# Patient Record
Sex: MALE | Race: WHITE | Employment: UNEMPLOYED | URBAN - METROPOLITAN AREA
[De-identification: names, ages, dates, MRNs, and addresses within clinical notes are randomized per-mention and may not be internally consistent; named-entity substitution may affect disease eponyms.]

---

## 2021-05-22 ENCOUNTER — ANESTHESIA EVENT (OUTPATIENT)
Dept: SURGERY | Age: 51
End: 2021-05-22
Payer: COMMERCIAL

## 2021-05-24 ENCOUNTER — HOSPITAL ENCOUNTER (OUTPATIENT)
Age: 51
Setting detail: OUTPATIENT SURGERY
Discharge: HOME | End: 2021-05-24
Attending: SURGERY | Admitting: SURGERY
Payer: COMMERCIAL

## 2021-05-24 ENCOUNTER — ANESTHESIA (OUTPATIENT)
Dept: SURGERY | Age: 51
End: 2021-05-24
Payer: COMMERCIAL

## 2021-05-24 VITALS
RESPIRATION RATE: 16 BRPM | BODY MASS INDEX: 22.96 KG/M2 | HEART RATE: 65 BPM | OXYGEN SATURATION: 95 % | TEMPERATURE: 98 F | WEIGHT: 160 LBS | DIASTOLIC BLOOD PRESSURE: 67 MMHG | SYSTOLIC BLOOD PRESSURE: 112 MMHG

## 2021-05-24 DIAGNOSIS — K40.90 RIGHT INGUINAL HERNIA: ICD-10-CM

## 2021-05-24 DIAGNOSIS — K42.0 INCARCERATED UMBILICAL HERNIA: Primary | ICD-10-CM

## 2021-05-24 LAB — HGB BLD-MCNC: 15.8 G/DL (ref 13.6–17.2)

## 2021-05-24 PROCEDURE — C1781 MESH (IMPLANTABLE): HCPCS | Performed by: SURGERY

## 2021-05-24 PROCEDURE — 49587 PR REPAIR UMBILICAL HERN,5+Y/O,STRANG: CPT | Performed by: SURGERY

## 2021-05-24 PROCEDURE — 77030008606 HC TRCR ENDOSC KII AMR -B: Performed by: SURGERY

## 2021-05-24 PROCEDURE — 74011250636 HC RX REV CODE- 250/636: Performed by: ANESTHESIOLOGY

## 2021-05-24 PROCEDURE — 77030037088 HC TUBE ENDOTRACH ORAL NSL COVD-A: Performed by: ANESTHESIOLOGY

## 2021-05-24 PROCEDURE — 77030020703 HC SEAL CANN DISP INTU -B: Performed by: SURGERY

## 2021-05-24 PROCEDURE — 77030031139 HC SUT VCRL2 J&J -A: Performed by: SURGERY

## 2021-05-24 PROCEDURE — 2709999900 HC NON-CHARGEABLE SUPPLY: Performed by: SURGERY

## 2021-05-24 PROCEDURE — 77030008518 HC TBNG INSUF ENDO STRY -B: Performed by: SURGERY

## 2021-05-24 PROCEDURE — 76210000021 HC REC RM PH II 0.5 TO 1 HR: Performed by: SURGERY

## 2021-05-24 PROCEDURE — 77030040922 HC BLNKT HYPOTHRM STRY -A: Performed by: ANESTHESIOLOGY

## 2021-05-24 PROCEDURE — 77030035277 HC OBTRTR BLDELSS DISP INTU -B: Performed by: SURGERY

## 2021-05-24 PROCEDURE — 74011000250 HC RX REV CODE- 250: Performed by: NURSE ANESTHETIST, CERTIFIED REGISTERED

## 2021-05-24 PROCEDURE — 74011250636 HC RX REV CODE- 250/636: Performed by: SURGERY

## 2021-05-24 PROCEDURE — 85018 HEMOGLOBIN: CPT

## 2021-05-24 PROCEDURE — 77030020829: Performed by: SURGERY

## 2021-05-24 PROCEDURE — 76210000006 HC OR PH I REC 0.5 TO 1 HR: Performed by: SURGERY

## 2021-05-24 PROCEDURE — 74011250636 HC RX REV CODE- 250/636: Performed by: NURSE ANESTHETIST, CERTIFIED REGISTERED

## 2021-05-24 PROCEDURE — 77030040361 HC SLV COMPR DVT MDII -B: Performed by: SURGERY

## 2021-05-24 PROCEDURE — 77030016151 HC PROTCTR LNS DFOG COVD -B: Performed by: SURGERY

## 2021-05-24 PROCEDURE — 74011000250 HC RX REV CODE- 250: Performed by: SURGERY

## 2021-05-24 PROCEDURE — 76060000035 HC ANESTHESIA 2 TO 2.5 HR: Performed by: SURGERY

## 2021-05-24 PROCEDURE — 49650 LAP ING HERNIA REPAIR INIT: CPT | Performed by: SURGERY

## 2021-05-24 PROCEDURE — 74011250637 HC RX REV CODE- 250/637: Performed by: ANESTHESIOLOGY

## 2021-05-24 PROCEDURE — 77030008522 HC TBNG INSUF LAPRO STRY -B: Performed by: SURGERY

## 2021-05-24 PROCEDURE — 77030022704 HC SUT VLOC COVD -B: Performed by: SURGERY

## 2021-05-24 PROCEDURE — 77030002912 HC SUT ETHBND J&J -A: Performed by: SURGERY

## 2021-05-24 PROCEDURE — 77030040830 HC CATH URETH FOL MDII -A: Performed by: SURGERY

## 2021-05-24 PROCEDURE — 77030019908 HC STETH ESOPH SIMS -A: Performed by: ANESTHESIOLOGY

## 2021-05-24 PROCEDURE — 77030039425 HC BLD LARYNG TRULITE DISP TELE -A: Performed by: ANESTHESIOLOGY

## 2021-05-24 PROCEDURE — 76010000876 HC OR TIME 2 TO 2.5HR INTENSV - TIER 2: Performed by: SURGERY

## 2021-05-24 DEVICE — MESH SLF-FLT PROGRIP RT -- PROGRIP: Type: IMPLANTABLE DEVICE | Site: GROIN | Status: FUNCTIONAL

## 2021-05-24 RX ORDER — LIDOCAINE HYDROCHLORIDE 10 MG/ML
0.1 INJECTION INFILTRATION; PERINEURAL AS NEEDED
Status: DISCONTINUED | OUTPATIENT
Start: 2021-05-24 | End: 2021-05-24 | Stop reason: HOSPADM

## 2021-05-24 RX ORDER — PROPOFOL 10 MG/ML
INJECTION, EMULSION INTRAVENOUS AS NEEDED
Status: DISCONTINUED | OUTPATIENT
Start: 2021-05-24 | End: 2021-05-24 | Stop reason: HOSPADM

## 2021-05-24 RX ORDER — CEFAZOLIN SODIUM/WATER 2 G/20 ML
2 SYRINGE (ML) INTRAVENOUS ONCE
Status: COMPLETED | OUTPATIENT
Start: 2021-05-24 | End: 2021-05-24

## 2021-05-24 RX ORDER — DEXAMETHASONE SODIUM PHOSPHATE 4 MG/ML
INJECTION, SOLUTION INTRA-ARTICULAR; INTRALESIONAL; INTRAMUSCULAR; INTRAVENOUS; SOFT TISSUE AS NEEDED
Status: DISCONTINUED | OUTPATIENT
Start: 2021-05-24 | End: 2021-05-24 | Stop reason: HOSPADM

## 2021-05-24 RX ORDER — NEOSTIGMINE METHYLSULFATE 1 MG/ML
INJECTION, SOLUTION INTRAVENOUS AS NEEDED
Status: DISCONTINUED | OUTPATIENT
Start: 2021-05-24 | End: 2021-05-24 | Stop reason: HOSPADM

## 2021-05-24 RX ORDER — OXYCODONE HYDROCHLORIDE 5 MG/1
10 TABLET ORAL
Status: COMPLETED | OUTPATIENT
Start: 2021-05-24 | End: 2021-05-24

## 2021-05-24 RX ORDER — OXYCODONE AND ACETAMINOPHEN 5; 325 MG/1; MG/1
1 TABLET ORAL
Qty: 20 TABLET | Refills: 0 | Status: SHIPPED | OUTPATIENT
Start: 2021-05-24 | End: 2021-05-29

## 2021-05-24 RX ORDER — LIDOCAINE HYDROCHLORIDE 20 MG/ML
INJECTION, SOLUTION EPIDURAL; INFILTRATION; INTRACAUDAL; PERINEURAL AS NEEDED
Status: DISCONTINUED | OUTPATIENT
Start: 2021-05-24 | End: 2021-05-24 | Stop reason: HOSPADM

## 2021-05-24 RX ORDER — DIPHENHYDRAMINE HYDROCHLORIDE 50 MG/ML
12.5 INJECTION, SOLUTION INTRAMUSCULAR; INTRAVENOUS
Status: DISCONTINUED | OUTPATIENT
Start: 2021-05-24 | End: 2021-05-24 | Stop reason: HOSPADM

## 2021-05-24 RX ORDER — ACETAMINOPHEN 500 MG
1000 TABLET ORAL ONCE
Status: COMPLETED | OUTPATIENT
Start: 2021-05-24 | End: 2021-05-24

## 2021-05-24 RX ORDER — FLUMAZENIL 0.1 MG/ML
0.2 INJECTION INTRAVENOUS AS NEEDED
Status: DISCONTINUED | OUTPATIENT
Start: 2021-05-24 | End: 2021-05-24 | Stop reason: HOSPADM

## 2021-05-24 RX ORDER — MIDAZOLAM HYDROCHLORIDE 1 MG/ML
2 INJECTION, SOLUTION INTRAMUSCULAR; INTRAVENOUS
Status: DISCONTINUED | OUTPATIENT
Start: 2021-05-24 | End: 2021-05-24 | Stop reason: HOSPADM

## 2021-05-24 RX ORDER — SODIUM CHLORIDE, SODIUM LACTATE, POTASSIUM CHLORIDE, CALCIUM CHLORIDE 600; 310; 30; 20 MG/100ML; MG/100ML; MG/100ML; MG/100ML
75 INJECTION, SOLUTION INTRAVENOUS CONTINUOUS
Status: DISCONTINUED | OUTPATIENT
Start: 2021-05-24 | End: 2021-05-24 | Stop reason: HOSPADM

## 2021-05-24 RX ORDER — ROCURONIUM BROMIDE 10 MG/ML
INJECTION, SOLUTION INTRAVENOUS AS NEEDED
Status: DISCONTINUED | OUTPATIENT
Start: 2021-05-24 | End: 2021-05-24 | Stop reason: HOSPADM

## 2021-05-24 RX ORDER — ONDANSETRON 2 MG/ML
INJECTION INTRAMUSCULAR; INTRAVENOUS AS NEEDED
Status: DISCONTINUED | OUTPATIENT
Start: 2021-05-24 | End: 2021-05-24 | Stop reason: HOSPADM

## 2021-05-24 RX ORDER — GLYCOPYRROLATE 0.2 MG/ML
INJECTION INTRAMUSCULAR; INTRAVENOUS AS NEEDED
Status: DISCONTINUED | OUTPATIENT
Start: 2021-05-24 | End: 2021-05-24 | Stop reason: HOSPADM

## 2021-05-24 RX ORDER — HYDROMORPHONE HYDROCHLORIDE 1 MG/ML
0.5 INJECTION, SOLUTION INTRAMUSCULAR; INTRAVENOUS; SUBCUTANEOUS
Status: DISCONTINUED | OUTPATIENT
Start: 2021-05-24 | End: 2021-05-24 | Stop reason: HOSPADM

## 2021-05-24 RX ORDER — NALOXONE HYDROCHLORIDE 0.4 MG/ML
0.1 INJECTION, SOLUTION INTRAMUSCULAR; INTRAVENOUS; SUBCUTANEOUS
Status: DISCONTINUED | OUTPATIENT
Start: 2021-05-24 | End: 2021-05-24 | Stop reason: HOSPADM

## 2021-05-24 RX ORDER — BUPIVACAINE HYDROCHLORIDE AND EPINEPHRINE 2.5; 5 MG/ML; UG/ML
INJECTION, SOLUTION EPIDURAL; INFILTRATION; INTRACAUDAL; PERINEURAL AS NEEDED
Status: DISCONTINUED | OUTPATIENT
Start: 2021-05-24 | End: 2021-05-24 | Stop reason: HOSPADM

## 2021-05-24 RX ORDER — OXYCODONE HYDROCHLORIDE 5 MG/1
5 TABLET ORAL
Status: DISCONTINUED | OUTPATIENT
Start: 2021-05-24 | End: 2021-05-24 | Stop reason: HOSPADM

## 2021-05-24 RX ORDER — FENTANYL CITRATE 50 UG/ML
INJECTION, SOLUTION INTRAMUSCULAR; INTRAVENOUS AS NEEDED
Status: DISCONTINUED | OUTPATIENT
Start: 2021-05-24 | End: 2021-05-24 | Stop reason: HOSPADM

## 2021-05-24 RX ADMIN — PROPOFOL 200 MG: 10 INJECTION, EMULSION INTRAVENOUS at 13:18

## 2021-05-24 RX ADMIN — OXYCODONE 10 MG: 5 TABLET ORAL at 15:59

## 2021-05-24 RX ADMIN — Medication 3 MG: at 14:52

## 2021-05-24 RX ADMIN — SODIUM CHLORIDE, SODIUM LACTATE, POTASSIUM CHLORIDE, AND CALCIUM CHLORIDE 75 ML/HR: 600; 310; 30; 20 INJECTION, SOLUTION INTRAVENOUS at 11:04

## 2021-05-24 RX ADMIN — GLYCOPYRROLATE 0.4 MG: 0.2 INJECTION, SOLUTION INTRAMUSCULAR; INTRAVENOUS at 14:52

## 2021-05-24 RX ADMIN — ROCURONIUM BROMIDE 50 MG: 10 INJECTION, SOLUTION INTRAVENOUS at 13:18

## 2021-05-24 RX ADMIN — LIDOCAINE HYDROCHLORIDE 80 MG: 20 INJECTION, SOLUTION EPIDURAL; INFILTRATION; INTRACAUDAL; PERINEURAL at 13:18

## 2021-05-24 RX ADMIN — DEXAMETHASONE SODIUM PHOSPHATE 4 MG: 4 INJECTION, SOLUTION INTRAMUSCULAR; INTRAVENOUS at 13:44

## 2021-05-24 RX ADMIN — CEFAZOLIN 2 G: 1 INJECTION, POWDER, FOR SOLUTION INTRAVENOUS at 13:30

## 2021-05-24 RX ADMIN — ONDANSETRON 4 MG: 2 INJECTION INTRAMUSCULAR; INTRAVENOUS at 13:44

## 2021-05-24 RX ADMIN — SODIUM CHLORIDE, SODIUM LACTATE, POTASSIUM CHLORIDE, AND CALCIUM CHLORIDE: 600; 310; 30; 20 INJECTION, SOLUTION INTRAVENOUS at 14:06

## 2021-05-24 RX ADMIN — ACETAMINOPHEN 1000 MG: 500 TABLET, FILM COATED ORAL at 11:04

## 2021-05-24 RX ADMIN — FENTANYL CITRATE 100 MCG: 50 INJECTION INTRAMUSCULAR; INTRAVENOUS at 13:18

## 2021-05-24 NOTE — ANESTHESIA PREPROCEDURE EVALUATION
Relevant Problems   No relevant active problems       Anesthetic History   No history of anesthetic complications            Review of Systems / Medical History  Patient summary reviewed and pertinent labs reviewed    Pulmonary  Within defined limits                 Neuro/Psych         Psychiatric history    Comments: Parkinson's dz - DBS, Sinemet Cardiovascular  Within defined limits                Exercise tolerance: >4 METS     GI/Hepatic/Renal  Within defined limits              Endo/Other  Within defined limits           Other Findings              Physical Exam    Airway  Mallampati: II  TM Distance: > 6 cm  Neck ROM: normal range of motion   Mouth opening: Normal     Cardiovascular    Rhythm: regular  Rate: normal         Dental  No notable dental hx       Pulmonary  Breath sounds clear to auscultation               Abdominal         Other Findings            Anesthetic Plan    ASA: 2  Anesthesia type: general            Anesthetic plan and risks discussed with: Patient and Spouse

## 2021-05-24 NOTE — ANESTHESIA POSTPROCEDURE EVALUATION
Procedure(s): HERNIA INGUINAL REPAIR ROBOTIC ASSISTED RIGHT WITH MESH  HERNIA UMBILICAL REPAIR. general    Anesthesia Post Evaluation      Multimodal analgesia: multimodal analgesia used between 6 hours prior to anesthesia start to PACU discharge  Patient location during evaluation: PACU  Patient participation: complete - patient participated  Level of consciousness: awake  Pain management: adequate  Airway patency: patent  Anesthetic complications: no  Cardiovascular status: acceptable and hemodynamically stable  Respiratory status: acceptable  Hydration status: acceptable  Comments: Acceptable for discharge from PACU. Post anesthesia nausea and vomiting:  none  Final Post Anesthesia Temperature Assessment:  Normothermia (36.0-37.5 degrees C)      INITIAL Post-op Vital signs:   Vitals Value Taken Time   /61 05/24/21 1559   Temp 36.7 °C (98 °F) 05/24/21 1530   Pulse 68 05/24/21 1600   Resp 16 05/24/21 1530   SpO2 94 % 05/24/21 1600   Vitals shown include unvalidated device data.

## 2021-05-24 NOTE — BRIEF OP NOTE
Brief Postoperative Note    Patient: Latha Bhagat  YOB: 1970  MRN: [id number]    Date of Procedure: 5/24/2021     Pre-Op Diagnosis: Unilateral inguinal hernia without obstruction or gangrene, recurrence not specified [Q49.73]  Umbilical hernia without obstruction and without gangrene [K42.9]    Post-Op Diagnosis: Indirect Right inguinal hernia and incarcerated umbilical hernia      Procedure(s):  OPEN INCARCERATED UMBILICAL HERNIA REPAIR THROUGH SEPARATE INCISION CPT 75317  ROBOTIC RIGHT INGUINAL HERNIA REPAIR WITH MESH CPT 11641    Surgeon(s):  Jose Martinez DO    Surgical Assistant: None    Anesthesia: General     Estimated Blood Loss (mL): Minimal    Complications: None    Specimens: * No specimens in log *     Implants:   Implant Name Type Inv. Item Serial No.  Lot No. LRB No. Used Action   MESH SLF-FLT PROGRIP RT -- PROGRIP - [id number]  MESH SLF-FLT PROGRIP RT -- PROGRIP  COVIDIEN  SURGICAL [id number] Right 1 Implanted       Drains: * No LDAs found *    Findings: Indirect right inguinal hernia, no LIH. Incarcerated UH 0.5 cm. No mesh for UH.     Electronically Signed by Tiana Pineda DO on 5/24/2021 at 2:57 PM  [id number]

## 2021-05-24 NOTE — INTERVAL H&P NOTE
Update History & Physical    The Patient's History and Physical of May 17,   2021 was reviewed with the patient and I examined the patient. There was no changes, however there was an error in the chief complaint of the H and P in the last sentence. He is here to for Right inguinal hernia and umbilical hernia. The surgical site was confirmed by the patient and me. Plan:  The risk, benefits, expected outcome, and alternative to the recommended procedure have been discussed with the patient. Patient understands and wants to proceed with the procedure.     Electronically signed by Radha Ryan DO on 5/24/2021 at 12:39 PM

## 2021-05-24 NOTE — DISCHARGE INSTRUCTIONS
Post op instructions:  1. May shower only, no tub bathing, no hot tub, no swimming. 2. Keep incision clean with regular soap and water. 3. No lifting over 10 lbs. 4. Regular diet as tolerated. 5. May remove topical dressing on Day #2. Leave steri strips until seen in Dr. Martinez's office at follow up appointment. 6. No driving on pain medicines. 7. Resume home medicines as usual.      Georgia Martinez, DO      MEDICATION INTERACTION:    During your procedure you potentially received a medication or medications which may reduce the effectiveness of oral contraceptives. Please consider other forms of contraception for 1 month following your procedure if you are currently using oral contraceptives as your primary form of birth control. In addition to this, we recommend continuing your oral contraceptive as prescribed, unless otherwise instructed by your physician, during this time. ACTIVITY  · As tolerated and as directed by your doctor. · You may shower in 24 hours. Do not take a bath until cleared by MD.     DIET  · Clear liquids until no nausea or vomiting, then light diet for the first day. · Advance to regular diet on second day, unless your doctor orders otherwise. · If nausea and vomiting continues, call your doctor. PAIN  · Take pain medication as directed by your doctor. · Call your doctor if pain is NOT relieved by medication. CALL YOUR DOCTOR IF   · Excessive bleeding that does not stop after holding pressure over the area. · Temperature of 101 degrees F or above. · Excessive redness, swelling or bruising, and/or green or yellow, smelly discharge from incision.     After general anesthesia or intravenous sedation, for 24 hours or while taking prescription Narcotics:  · Limit your activities  · A responsible adult needs to be with you for the next 24 hours  · Do not drive and operate hazardous machinery  · Do not make important personal or business decisions  · Do not drink alcoholic beverages  · If you have not urinated within 8 hours after discharge, and you are experiencing discomfort from urinary retention, please go to the nearest ED. · If you have sleep apnea and have a CPAP machine, please use it for all naps and sleeping. · Please use caution when taking narcotics and any of your home medications that may cause drowsiness. *  Please give a list of your current medications to your Primary Care Provider. *  Please update this list whenever your medications are discontinued, doses are      changed, or new medications (including over-the-counter products) are added. *  Please carry medication information at all times in case of emergency situations. These are general instructions for a healthy lifestyle:  No smoking/ No tobacco products/ Avoid exposure to second hand smoke  Surgeon General's Warning:  Quitting smoking now greatly reduces serious risk to your health. Obesity, smoking, and sedentary lifestyle greatly increases your risk for illness  A healthy diet, regular physical exercise & weight monitoring are important for maintaining a healthy lifestyle    You may be retaining fluid if you have a history of heart failure or if you experience any of the following symptoms:  Weight gain of 3 pounds or more overnight or 5 pounds in a week, increased swelling in our hands or feet or shortness of breath while lying flat in bed. Please call your doctor as soon as you notice any of these symptoms; do not wait until your next office visit.

## 2021-05-25 NOTE — OP NOTES
[address] [facility]  OPERATIVE REPORT    Name:  Paulette Forrest  MR#:  [id number]  :  1970  ACCOUNT #:  [de-identified]  DATE OF SERVICE:  2021    PREOPERATIVE DIAGNOSES:  1. Incarcerated umbilical hernia. 2.  Right inguinal hernia. POSTOPERATIVE DIAGNOSIS:  same. PROCEDURES PERFORMED:  1. Open incarcerated umbilical hernia repair through separate incision, CPT code 51048.  2.  Robotic inguinal hernia repair with mesh through separate incision, CPT code 22549. SURGEON:  Kiel Peña DO    ASSISTANT:  None. ANESTHESIA:  General endotracheal.    COMPLICATIONS:  None. SPECIMENS REMOVED:  None. IMPLANTS:  Right inguinal hernia ProGrip mesh. ESTIMATED BLOOD LOSS:  Minimal.    DISPOSITION:  Stable. COUNTS:  Sponge count, needle count, instrument count all correct x3. DESCRIPTION OF THE PROCEDURE:  This is a 49-year-old male with incarcerated umbilical hernia and right inguinal hernia. He is prepared for open umbilical hernia repair with possible use of mesh and robotic right inguinal hernia repair through separate incisions. Consent was obtained by describing the procedure to the patient including potential complications to include infection, bleeding, and possible use of mesh. Consent was obtained and placed in the final chart. He was administered Ancef 2 g IV preoperatively, taken to the operating suite, placed in the supine position, and general anesthesia was initiated without complications. He was then prepped and draped in the usual sterile fashion. Time-out was taken to confirm the patient name and proper procedure. Following this, a supraumbilical incision was planned. A 0.25% Marcaine with epinephrine was used to anesthetize the skin and subcutaneous tissue. A #11 scalpel blade was used to make a skin incision. Bovie cauterization was used to dissect down to the rectus fascia. Fascia was then incised. 0 Vicryl sutures were placed as anchoring stitches. Peritoneum was elevated between tonsil snips and entered with Metzenbaum scissors. A robotic trocar was then placed into the peritoneum and secured into place and then a CO2 gas was used to create pneumoperitoneum of 15 mmHg. Laparoscope was passed into the abdomen and a brief survey revealed a right inguinal hernia indirect and no evidence of left inguinal hernia. Then, we placed 8.5 mm trocars in the right and left upper quadrant under direct visualization with no evidence of bleeding or injury. The ProGrip mesh was then inserted into the peritoneum safely under direct visualization with no evidence of injury and then we placed a 2-0 V-Loc suture into the peritoneal cavity under direct visualization with no evidence of injury. The robot was then safely docked to the patient. I then took control on the back console and began by placing the needle into the peritoneum for safe keeping and we then began dissecting the right inguinal region by entering the preperitoneal space. The peritoneum was scored above the hernia and then we dissected in the preperitoneal space down to the pubic tubercle medially and just inside the ASIS laterally. We then reduced the hernia dissecting from the cord structures and reflecting the inguinal hernia sac inferiorly. There was a small direct inguinal hernia that was also reduced. Once the preperitoneal space was developed, we placed the ProGrip mesh into the preperitoneal space and unfurled the mesh centering over the hernia defect with a nice even orientation and minimal wrinkling. At this point, we concluded. There was no evidence of bleeding. The peritoneum was then closed with a 2-0 V-Loc in a simple running fashion. The needle was then removed and accounted for on the back table. At this point, we performed a brief survey of the abdomen. There was no evidence of any biliary leakage or enteric contents to suggest a bowel perforation.   All instruments were removed safely from the patient. At this point, the robot was then removed from the patient safely. All trocars were then removed and the supraumbilical port site was then closed with the 0 Vicryl in a figure-of-eight fashion x1 and all of the incisions were then closed with a 3-0 Vicryl in a simple running fashion. We then turned our attention to the umbilical hernia. There was an umbilical hernia that was incarcerated. We planned for an inferior incision and a separate incision was made. A 0.25% Marcaine with epinephrine was used to anesthetize the skin and subcutaneous tissue. A #15 scalpel blade was used to make a curvilinear incision. Bovie cauterization was used to dissect down to the rectus fascia and then the umbilical stalk was divided at the level of the fascia exposing incarcerated omental fat. This was reduced and amputated at the level of the fascia with minimal bleeding and the incarcerated  omental fat was discarded. There was no evidence of strangulation or gangrenous change. The hernia defect was then able to be evaluated and measured 0.5 cm. Mesh was not elected in this case and a #1 Ethibond was used to close the defect in a figure-of-eight fashion x1. At this point, we copiously irrigated with saline until clear. We reattached the umbilical stalk with #1 Ethibond x1 and irrigated once again. We then closed the subcutaneous tissue with 3-0 Vicryl in an interrupted fashion and 3-0 Vicryl in a simple running fashion. Mastisol and Steri-Strips were placed on top of all incisions and sterile dressings were applied. The patient will be extubated and transferred to Recovery stable. FINDINGS:  A 49-year-old male with a right inguinal hernia and incarcerated umbilical hernia. Open umbilical hernia was performed through a separate incision with incarcerated omental fat. No mesh was used. A robotic right inguinal hernia was performed with no evidence of left inguinal hernia.   A ProGrip mesh was placed into the preperitoneal space. He tolerated the procedure well without immediate complications.       [address], DO      [id number]  D:  05/24/2021 15:13  T:  05/24/2021 21:05  JOB #:  [id number]

## 2022-05-24 ENCOUNTER — OFFICE VISIT (OUTPATIENT)
Dept: INTERNAL MEDICINE CLINIC | Facility: CLINIC | Age: 52
End: 2022-05-24

## 2022-05-24 DIAGNOSIS — Z12.5 SCREENING FOR PROSTATE CANCER: ICD-10-CM

## 2022-05-24 DIAGNOSIS — G20 PARKINSON DISEASE (HCC): Primary | ICD-10-CM

## 2022-05-24 LAB
ANION GAP SERPL CALC-SCNC: 3 MMOL/L (ref 7–16)
BASOPHILS # BLD: 0.1 K/UL (ref 0–0.2)
BASOPHILS NFR BLD: 2 % (ref 0–2)
BUN SERPL-MCNC: 10 MG/DL (ref 6–23)
CALCIUM SERPL-MCNC: 8.7 MG/DL (ref 8.3–10.4)
CHLORIDE SERPL-SCNC: 108 MMOL/L (ref 98–107)
CO2 SERPL-SCNC: 28 MMOL/L (ref 21–32)
CREAT SERPL-MCNC: 0.9 MG/DL (ref 0.8–1.5)
DIFFERENTIAL METHOD BLD: ABNORMAL
EOSINOPHIL # BLD: 0.2 K/UL (ref 0–0.8)
EOSINOPHIL NFR BLD: 4 % (ref 0.5–7.8)
ERYTHROCYTE [DISTWIDTH] IN BLOOD BY AUTOMATED COUNT: 12.4 % (ref 11.9–14.6)
GLUCOSE SERPL-MCNC: 88 MG/DL (ref 65–100)
HCT VFR BLD AUTO: 47.4 % (ref 41.1–50.3)
HGB BLD-MCNC: 15.6 G/DL (ref 13.6–17.2)
IMM GRANULOCYTES # BLD AUTO: 0 K/UL (ref 0–0.5)
IMM GRANULOCYTES NFR BLD AUTO: 0 % (ref 0–5)
LYMPHOCYTES # BLD: 1.5 K/UL (ref 0.5–4.6)
LYMPHOCYTES NFR BLD: 34 % (ref 13–44)
MAGNESIUM SERPL-MCNC: 2.3 MG/DL (ref 1.8–2.4)
MCH RBC QN AUTO: 30.2 PG (ref 26.1–32.9)
MCHC RBC AUTO-ENTMCNC: 32.9 G/DL (ref 31.4–35)
MCV RBC AUTO: 91.9 FL (ref 79.6–97.8)
MONOCYTES # BLD: 0.7 K/UL (ref 0.1–1.3)
MONOCYTES NFR BLD: 17 % (ref 4–12)
NEUTS SEG # BLD: 1.8 K/UL (ref 1.7–8.2)
NEUTS SEG NFR BLD: 43 % (ref 43–78)
NRBC # BLD: 0 K/UL (ref 0–0.2)
PLATELET # BLD AUTO: 191 K/UL (ref 150–450)
PMV BLD AUTO: 10.6 FL (ref 9.4–12.3)
POTASSIUM SERPL-SCNC: 4.2 MMOL/L (ref 3.5–5.1)
PSA SERPL-MCNC: 1.6 NG/ML
RBC # BLD AUTO: 5.16 M/UL (ref 4.23–5.6)
SODIUM SERPL-SCNC: 139 MMOL/L (ref 138–145)
WBC # BLD AUTO: 4.2 K/UL (ref 4.3–11.1)

## 2022-05-31 ENCOUNTER — OFFICE VISIT (OUTPATIENT)
Dept: INTERNAL MEDICINE CLINIC | Facility: CLINIC | Age: 52
End: 2022-05-31
Payer: COMMERCIAL

## 2022-05-31 VITALS
DIASTOLIC BLOOD PRESSURE: 68 MMHG | TEMPERATURE: 97.1 F | WEIGHT: 167 LBS | HEART RATE: 71 BPM | SYSTOLIC BLOOD PRESSURE: 94 MMHG | BODY MASS INDEX: 23.91 KG/M2 | OXYGEN SATURATION: 96 % | HEIGHT: 70 IN

## 2022-05-31 DIAGNOSIS — F32.A DEPRESSION, UNSPECIFIED DEPRESSION TYPE: ICD-10-CM

## 2022-05-31 DIAGNOSIS — G20 PARKINSON DISEASE (HCC): Primary | ICD-10-CM

## 2022-05-31 PROCEDURE — 99214 OFFICE O/P EST MOD 30 MIN: CPT | Performed by: INTERNAL MEDICINE

## 2022-05-31 ASSESSMENT — PATIENT HEALTH QUESTIONNAIRE - PHQ9
1. LITTLE INTEREST OR PLEASURE IN DOING THINGS: 0
SUM OF ALL RESPONSES TO PHQ QUESTIONS 1-9: 0
2. FEELING DOWN, DEPRESSED OR HOPELESS: 0
SUM OF ALL RESPONSES TO PHQ9 QUESTIONS 1 & 2: 0

## 2022-05-31 ASSESSMENT — ENCOUNTER SYMPTOMS
CONSTIPATION: 0
NAUSEA: 0
SINUS PAIN: 0
VOMITING: 0
ABDOMINAL PAIN: 0
SHORTNESS OF BREATH: 0
WHEEZING: 0
DIARRHEA: 0

## 2022-05-31 NOTE — PROGRESS NOTES
Denny Jose was seen today for follow-up. Diagnoses and all orders for this visit:    Parkinson disease ([facility] [email or web address] 75.)  -     CBC with Auto Differential; Future  -     Hemoglobin A1C; Future  -     TSH; Future  -     ORTIZ and PE, Serum; Future  -     Lipid Panel; Future    Depression, unspecified depression type  -     sertraline (ZOLOFT) 50 MG tablet; Take 1 tablet by mouth daily          Darcie Betts is a 46 y.o. male    Chief Complaint   Patient presents with    Follow-up     Busy. not exercising . Hemorrhoids still bother him at times  Creams help. Long list things talk about. Enlarged prostate  Joint pain -not concerning. Hearing--wife concerned      No results found for this visit on 05/31/22. Past Medical History:   Diagnosis Date    Depression     Parkinson disease ([facility] [email or web address]] 75.) 2009       Family History   Problem Relation Age of Onset    Obesity Brother     Depression Father     Depression Mother     Other Father         precanerous polyps removed        Social History     Socioeconomic History    Marital status:      Spouse name: Not on file    Number of children: Not on file    Years of education: Not on file    Highest education level: Not on file   Occupational History    Not on file   Tobacco Use    Smoking status: Never Smoker    Smokeless tobacco: Former User   Substance and Sexual Activity    Alcohol use: Yes    Drug use: Not Currently    Sexual activity: Not on file   Other Topics Concern    Not on file   Social History Narrative    Not on file     Social Determinants of Health     Financial Resource Strain:     Difficulty of Paying Living Expenses: Not on file   Food Insecurity:     Worried About Running Out of Food in the Last Year: Not on file    Janna of Food in the Last Year: Not on file   Transportation Needs:     Lack of Transportation (Medical): Not on file    Lack of Transportation (Non-Medical):  Not on file   Physical Activity:     Days of Exercise per Week: Not on file    Minutes of Exercise per Session: Not on file   Stress:     Feeling of Stress : Not on file   Social Connections:     Frequency of Communication with Friends and Family: Not on file    Frequency of Social Gatherings with Friends and Family: Not on file    Attends [employer] Services: Not on file    Active Member of [address] [place] [address] or Organizations: Not on file    Attends Club or Organization Meetings: Not on file    Marital Status: Not on file   Intimate Partner Violence:     Fear of Current or Ex-Partner: Not on file    Emotionally Abused: Not on file    Physically Abused: Not on file    Sexually Abused: Not on file   Housing Stability:     Unable to Pay for Housing in the Last Year: Not on file    Number of [place] in the Last Year: Not on file    Unstable Housing in the Last Year: Not on file         Current Outpatient Medications:     sertraline (ZOLOFT) 50 MG tablet, Take 1 tablet by mouth daily, Disp: 30 tablet, Rfl: 11    carbidopa-levodopa (SINEMET)  MG per tablet, Take 0.5 tablets by mouth 4 times daily, Disp: , Rfl:     rOPINIRole (REQUIP) 0.5 MG tablet, Take 0.5 mg by mouth nightly, Disp: , Rfl:     amantadine (SYMMETREL) 100 MG capsule, Take 100 mg by mouth 3 times daily, Disp: , Rfl:     No Known Allergies      Review of Systems   Constitutional: Negative for appetite change, chills, fatigue and fever. HENT: Negative for sinus pain. Respiratory: Negative for shortness of breath and wheezing. Cardiovascular: Negative for chest pain. Gastrointestinal: Negative for abdominal pain, constipation, diarrhea, nausea and vomiting. Genitourinary: Negative for dysuria and frequency. Musculoskeletal: Positive for gait problem. Negative for myalgias. Neurological: Positive for tremors and weakness. Negative for dizziness. Psychiatric/Behavioral: Negative for dysphoric mood and suicidal ideas. All other systems reviewed and are negative.         Vitals:    05/31/22

## 2022-06-06 ENCOUNTER — OFFICE VISIT (OUTPATIENT)
Dept: NEUROLOGY | Age: 52
End: 2022-06-06
Payer: COMMERCIAL

## 2022-06-06 VITALS
BODY MASS INDEX: 24.48 KG/M2 | HEART RATE: 72 BPM | DIASTOLIC BLOOD PRESSURE: 64 MMHG | HEIGHT: 70 IN | SYSTOLIC BLOOD PRESSURE: 104 MMHG | WEIGHT: 171 LBS

## 2022-06-06 DIAGNOSIS — G20 DEMENTIA DUE TO PARKINSON'S DISEASE WITHOUT BEHAVIORAL DISTURBANCE (HCC): ICD-10-CM

## 2022-06-06 DIAGNOSIS — T42.8X5A MOTOR FLUCTUATIONS RELATED TO MEDICATION USE IN PARKINSON'S DISEASE (HCC): ICD-10-CM

## 2022-06-06 DIAGNOSIS — T42.8X5A LEVODOPA-INDUCED DYSKINESIA: ICD-10-CM

## 2022-06-06 DIAGNOSIS — G24.01 LEVODOPA-INDUCED DYSKINESIA: ICD-10-CM

## 2022-06-06 DIAGNOSIS — F02.80 DEMENTIA DUE TO PARKINSON'S DISEASE WITHOUT BEHAVIORAL DISTURBANCE (HCC): ICD-10-CM

## 2022-06-06 DIAGNOSIS — Z74.09 IMPAIRED FUNCTIONAL MOBILITY, BALANCE, GAIT, AND ENDURANCE: ICD-10-CM

## 2022-06-06 DIAGNOSIS — Z96.89 S/P DEEP BRAIN STIMULATOR PLACEMENT: ICD-10-CM

## 2022-06-06 DIAGNOSIS — G20 MOTOR FLUCTUATIONS RELATED TO MEDICATION USE IN PARKINSON'S DISEASE (HCC): ICD-10-CM

## 2022-06-06 DIAGNOSIS — G20 PARKINSON'S DISEASE (HCC): Primary | ICD-10-CM

## 2022-06-06 PROCEDURE — 95983 ALYS BRN NPGT PRGRMG 15 MIN: CPT | Performed by: PSYCHIATRY & NEUROLOGY

## 2022-06-06 PROCEDURE — 99215 OFFICE O/P EST HI 40 MIN: CPT | Performed by: PSYCHIATRY & NEUROLOGY

## 2022-06-06 RX ORDER — DONEPEZIL HYDROCHLORIDE 5 MG/1
5 TABLET, FILM COATED ORAL NIGHTLY
Qty: 30 TABLET | Refills: 5 | Status: SHIPPED | OUTPATIENT
Start: 2022-06-06

## 2022-06-06 ASSESSMENT — ENCOUNTER SYMPTOMS
COUGH: 0
VOICE CHANGE: 1
CONSTIPATION: 1

## 2022-06-06 NOTE — PROGRESS NOTES
Nancy Carreno  [address], [address] [facility], [address] [place] [address]  Phone: [phone] Fax [phone]  Barbara Gonzalez MD      Patient: Kaylen Boyer  Provider: Barbara Gonzalez MD    CC:   Chief Complaint   Patient presents with    Follow-up     Parkinsons disease     Referring Provider:    History of Present Illness:     Kaylen Boyer is a 46 y.o. RH male who presents for follow up of Parkinson's disease. He is unaccompanied for today's visit. He was last seen November 2021.     He presents for follow-up and continued management of Parkinson's disease, diagnosed in approximately 2009 with tremor in his left upper extremity. He has been followed at [address], undergoing a bilateral STN DBS ([employer]) in May 2017 for his bilateral tremor. He is an INTREPID study participant. Has continued follow up at [address]. He established care with our group in December 2019.      Current medications include:  Carbidopa levodopa  mg 1/2 tablet 4 times a day  Sertraline 50 mg daily  Amantadine 100 mg three times a day  Requip 0.5 mg HS PRN      Previous medication trials include: N/A    He presents today for follow-up. He recently finished his study visits at [address]. He continues to follow-up with our clinic as well. Only minor adjustments to his DBS have been made since his last visit. Usually this is to address worsening motor symptoms on either which include tremor and rigidity. There have been some limitations due to stimulation related adverse effects of dyskinesias with DBS stimulation. There has been some changes in his gait with other moments of hesitation particularly in doorways and with turns. He does note some generalized slowing of his gait overall particularly on the right. Continues to take levodopa as noted above. No adverse effects. No significant wearing off in between doses and no dyskinesias.     He does have some worsening cognitive complaints.   Appears to have more difficulty with executive function and slower processing speed. He feels it takes him longer to do things and this is a source of frustration for him. He notes frequent word finding difficulty. No visual hallucinations and appears to be sleeping well. He continues to take sertraline for his mood and this has been relatively stable.       Review of Systems:   Review of Systems   Constitutional: Negative for fever. HENT: Positive for voice change. Negative for congestion. Eyes: Negative for visual disturbance. Respiratory: Negative for cough. Cardiovascular: Negative for chest pain. Gastrointestinal: Positive for constipation. Genitourinary: Negative for dysuria. Musculoskeletal: Positive for gait problem. Skin: Negative for rash. Neurological: Positive for tremors and weakness. Psychiatric/Behavioral: Positive for confusion (memory loss) and decreased concentration. Negative for hallucinations. Lab/Imaging Review:   I REVIEWED PERTINENT LABS, IMAGES, AND REPORTS WITH THE PATIENT PERSONALLY, DIRECTLY AND FULLY. THE MOST PERTINENT FINDINGS ARE NOTED BELOW:    MRI Brain April 2017:  FINDINGS:   Diffusion imaging shows no hyperacute, acute, or early subacute infarction. Normal supratentorial signal. No definite volume loss of the brainstem or cerebellum. There is no mass or mass effect, or abnormal extra-axial collection. The ventricles are normal in size, shape and position. The paranasal sinuses and the mastoid air cells are predominantly clear. IMPRESSION:   Normal supratentorial and infratentorial signal. No definite volume loss of the brainstem or cerebellum. MR study performed as a pre-DBS protocol. CT Head June 2017:   FINDINGS:    Newly placed deep brain stimulator electrodes terminate in the region of the subthalamic nuclei. No evidence of intracranial hemorrhage or extra-axial collection. The ventricles are normal in size.  The density of the dural venous sinuses is normal. The skull base and calvarium are normal. The included paranasal sinuses and mastoid air cells are predominantly clear. The visualized orbits are unremarkable. Past Medical History:     Past medical history, surgical history, social history, family history, medications, and allergies were reviewed and updated as appropriate. PAST MEDICAL HISTORY:  Past Medical History:   Diagnosis Date    Depression     Parkinson disease ([facility] [address] 2009     PAST SURGICAL HISTORY:   Past Surgical History:   Procedure Laterality Date    HERNIA REPAIR  05/24/2021    Robotic RIHR/R    OTHER SURGICAL HISTORY  2017    DBS-implant    OTHER SURGICAL HISTORY  1988, 2016    neck, fused vertrbrae     FAMILY HISTORY:  Family History   Problem Relation Age of Onset    Obesity Brother     Depression Father     Depression Mother     Other Father         precanerous polyps removed      SOCIAL HISTORY:  Social History     Socioeconomic History    Marital status:      Spouse name: None    Number of children: None    Years of education: None    Highest education level: None   Occupational History    None   Tobacco Use    Smoking status: Never Smoker    Smokeless tobacco: Former User   Substance and Sexual Activity    Alcohol use: Yes    Drug use: Not Currently    Sexual activity: None   Other Topics Concern    None   Social History Narrative    None     Social Determinants of Health     Financial Resource Strain:     Difficulty of Paying Living Expenses: Not on file   Food Insecurity:     Worried About Running Out of Food in the Last Year: Not on file    Janna of Food in the Last Year: Not on file   Transportation Needs:     Lack of Transportation (Medical): Not on file    Lack of Transportation (Non-Medical):  Not on file   Physical Activity:     Days of Exercise per Week: Not on file    Minutes of Exercise per Session: Not on file   Stress:     Feeling of Stress : Not on file   Social Connections:  Frequency of Communication with Friends and Family: Not on file    Frequency of Social Gatherings with Friends and Family: Not on file    Attends Holiness Services: Not on file    Active Member of Clubs or Organizations: Not on file    Attends Club or Organization Meetings: Not on file    Marital Status: Not on file   Intimate Partner Violence:     Fear of Current or Ex-Partner: Not on file    Emotionally Abused: Not on file    Physically Abused: Not on file    Sexually Abused: Not on file   Housing Stability:     Unable to Pay for Housing in the Last Year: Not on file    Number of [place] in the Last Year: Not on file    Unstable Housing in the Last Year: Not on file       Medications/Allergies:     MEDICATIONS:   Outpatient Encounter Medications as of 6/6/2022   Medication Sig Dispense Refill    donepezil (ARICEPT) 5 MG tablet Take 1 tablet by mouth nightly 30 tablet 5    sertraline (ZOLOFT) 50 MG tablet Take 1 tablet by mouth daily 30 tablet 11    amantadine (SYMMETREL) 100 MG capsule Take 100 mg by mouth 3 times daily      carbidopa-levodopa (SINEMET)  MG per tablet Take 0.5 tablets by mouth 4 times daily      rOPINIRole (REQUIP) 0.5 MG tablet Take 0.5 mg by mouth nightly       No facility-administered encounter medications on file as of 6/6/2022. ALLERGIES:  No Known Allergies      Physical Exam:     /64   Pulse 72   Ht 5' 10\" (1.778 m)   Wt 171 lb (77.6 kg)   BMI 24.54 kg/m²     General Exam:  General - Well developed, well nourished, in no apparent distress. HEENT - Normocephalic, atraumatic. Sclera anicteric. Oropharynx clear. Neck - Supple without masses  Cardiovascular - Regular rate and rhythm. No carotid bruits. Lungs - Non-labored breathing.   Abdomen - Soft, nontender, nondistended. Extremities - Peripheral pulses intact. No edema and no rashes.      Neurological Exam:      MS/Language/Speech - Alert. Oriented to person, place, and time.   Attention intact. Language fluent. Speech with mild hypophonia.      Cranial Nerves - PERRL. Eye movements full with saccadic pursuits. No nystagmus. There is diminished facial expression but activation was symmetric and sensation normal. There is slight rightward neck shift. Tongue and palate were midline. Shoulder shrug symmetric.     Motor - Intermittent resting tremor of L>R upper and lower extremity. Rapid alternating movements show mild to moderate slowing of the right upper extremity and moderate slowing in the left upper extremity. Foot tapping is slowed bilaterally. There is a mild to moderate increase in tone bilaterally.     Sensory - Normal to light touch throughout.     Cerebellar - No ataxia or dysmetria.      Reflexes (R/L): Biceps (2+/2+), Triceps (2+/2+), Brachioradialis (2+/2+), Patellar (2+/2+), Ankle (0+/0+)     Gait - Able to rise from a seated position without use of his arms. Posture is mildly stooped. Romberg was normal. He walks with a narrow base with slightly reduced stride length. There is diminished arm swing noted bilaterally. No hesitation freezing was noted today. Postural reflex some delay. DBS Programming:     His [employer] DBS was interrogated. Impedances were within normal limits.     Settings:   L STN              Level 3 (100%)                                   2.3 mA / 40 / 176     R STN             Level 11 (50%) 12 (50%)                    2.2 mA / 70 / 176     Comments:  DBS was interrogated. No changes were made today. Total DBS programming time: 10 minutes      Assessment and Plan:     Checo Sherwood is a 46 y.o. male who presents with the following issues:     Geetha Marlen was seen today for follow-up. Diagnoses and all orders for this visit:    Parkinson's disease ([facility] [address]    Dementia due to Parkinson's disease without behavioral disturbance ([facility] [address]  -     donepezil (ARICEPT) 5 MG tablet;  Take 1 tablet by mouth nightly    Impaired functional mobility, balance, gait, and endurance    Motor fluctuations related to medication use in Parkinson's disease ([address]    Levodopa-induced dyskinesia    S/P deep brain stimulator placement      Patient presents for follow-up of Parkinson's disease s/p bilateral STN DBS complicated by resting tremor, motor fluctuations related to use of levodopa, levodopa-induced dyskinesias, gait disturbances, and mild cognitive impairment. He recently finished the clinical trial ([employer]) study at [address]. DBS was successfully interrogated today but we deferred any changes. Start donepezil 5 mg at night for cognitive dysfunction likely consistent with mild PD dementia. Continue to monitor for any worsening cognitive symptoms or any psychotic symptoms. Discussed increasing Sinemet  mg to 1 tablet 4 times a day in order to address motor fluctuations and worsening mobility.     Discussed referrals to physical therapy for his gait/mobility as well as speech therapy for his hypophonia. He has deferred for now but will let us know if he reconsiders.     He will continue amantadine as prescribed which is also helpful for dyskinesias. He may continue Requip at night as needed for restless leg symptoms.      We will continue sertraline 50 mg daily for his mood.        We will see him back in approximately 6 months. Signature: Gregoria Martinez MD      Date:  6/12/2022    [facility]   [address]  Ph: [phone]  Fax: [phone]         I have personally interviewed and examined Mr. Honey Tapia and I have personally reviewed all relevant records including labs and imaging as noted above. I have written all aspects of this note. More than 50% of this time was used for counseling regarding my diagnosis, prognosis, and plans for management. DBS Programming Time: 10 minutes  Total visit time: 53 minutes.

## 2022-07-11 ENCOUNTER — PATIENT MESSAGE (OUTPATIENT)
Dept: NEUROLOGY | Age: 52
End: 2022-07-11

## 2022-07-11 NOTE — TELEPHONE ENCOUNTER
Requested Prescriptions     Signed Prescriptions Disp Refills    carbidopa-levodopa (SINEMET)  MG per tablet 120 tablet 5     Sig: Take 1 tablet by mouth 4 times daily     Authorizing Provider: Irwin Gaviria     Ordering User: Kanu Guzman

## 2022-09-30 DIAGNOSIS — F32.A DEPRESSION, UNSPECIFIED DEPRESSION TYPE: ICD-10-CM

## 2022-11-30 ENCOUNTER — NURSE ONLY (OUTPATIENT)
Dept: INTERNAL MEDICINE CLINIC | Facility: CLINIC | Age: 52
End: 2022-11-30

## 2022-11-30 DIAGNOSIS — G20 PARKINSON DISEASE (HCC): ICD-10-CM

## 2022-11-30 LAB
BASOPHILS # BLD: 0.1 K/UL (ref 0–0.2)
BASOPHILS NFR BLD: 2 % (ref 0–2)
CHOLEST SERPL-MCNC: 169 MG/DL
DIFFERENTIAL METHOD BLD: NORMAL
EOSINOPHIL # BLD: 0.2 K/UL (ref 0–0.8)
EOSINOPHIL NFR BLD: 3 % (ref 0.5–7.8)
ERYTHROCYTE [DISTWIDTH] IN BLOOD BY AUTOMATED COUNT: 12.2 % (ref 11.9–14.6)
EST. AVERAGE GLUCOSE BLD GHB EST-MCNC: 103 MG/DL
HBA1C MFR BLD: 5.2 % (ref 4.8–5.6)
HCT VFR BLD AUTO: 49.2 % (ref 41.1–50.3)
HDLC SERPL-MCNC: 40 MG/DL (ref 40–60)
HDLC SERPL: 4.2 {RATIO}
HGB BLD-MCNC: 16.1 G/DL (ref 13.6–17.2)
IMM GRANULOCYTES # BLD AUTO: 0 K/UL (ref 0–0.5)
IMM GRANULOCYTES NFR BLD AUTO: 0 % (ref 0–5)
LDLC SERPL CALC-MCNC: 118.2 MG/DL
LYMPHOCYTES # BLD: 2.1 K/UL (ref 0.5–4.6)
LYMPHOCYTES NFR BLD: 38 % (ref 13–44)
MCH RBC QN AUTO: 30.5 PG (ref 26.1–32.9)
MCHC RBC AUTO-ENTMCNC: 32.7 G/DL (ref 31.4–35)
MCV RBC AUTO: 93.2 FL (ref 82–102)
MONOCYTES # BLD: 0.5 K/UL (ref 0.1–1.3)
MONOCYTES NFR BLD: 10 % (ref 4–12)
NEUTS SEG # BLD: 2.6 K/UL (ref 1.7–8.2)
NEUTS SEG NFR BLD: 47 % (ref 43–78)
NRBC # BLD: 0 K/UL (ref 0–0.2)
PLATELET # BLD AUTO: 220 K/UL (ref 150–450)
PMV BLD AUTO: 10 FL (ref 9.4–12.3)
RBC # BLD AUTO: 5.28 M/UL (ref 4.23–5.6)
TRIGL SERPL-MCNC: 54 MG/DL (ref 35–150)
TSH, 3RD GENERATION: 1.45 UIU/ML (ref 0.36–3.74)
VLDLC SERPL CALC-MCNC: 10.8 MG/DL (ref 6–23)
WBC # BLD AUTO: 5.6 K/UL (ref 4.3–11.1)

## 2022-12-07 ENCOUNTER — OFFICE VISIT (OUTPATIENT)
Dept: INTERNAL MEDICINE CLINIC | Facility: CLINIC | Age: 52
End: 2022-12-07
Payer: COMMERCIAL

## 2022-12-07 VITALS
HEART RATE: 82 BPM | BODY MASS INDEX: 23.77 KG/M2 | OXYGEN SATURATION: 96 % | SYSTOLIC BLOOD PRESSURE: 102 MMHG | HEIGHT: 70 IN | DIASTOLIC BLOOD PRESSURE: 66 MMHG | WEIGHT: 166 LBS

## 2022-12-07 DIAGNOSIS — F33.0 MAJOR DEPRESSIVE DISORDER, RECURRENT, MILD (HCC): ICD-10-CM

## 2022-12-07 DIAGNOSIS — Z12.5 SCREENING FOR PROSTATE CANCER: ICD-10-CM

## 2022-12-07 DIAGNOSIS — G20 PARKINSON DISEASE (HCC): Primary | ICD-10-CM

## 2022-12-07 DIAGNOSIS — Z00.00 ROUTINE ADULT HEALTH MAINTENANCE: ICD-10-CM

## 2022-12-07 PROCEDURE — 99396 PREV VISIT EST AGE 40-64: CPT | Performed by: INTERNAL MEDICINE

## 2022-12-07 ASSESSMENT — PATIENT HEALTH QUESTIONNAIRE - PHQ9
1. LITTLE INTEREST OR PLEASURE IN DOING THINGS: 0
2. FEELING DOWN, DEPRESSED OR HOPELESS: 0
SUM OF ALL RESPONSES TO PHQ QUESTIONS 1-9: 4
10. IF YOU CHECKED OFF ANY PROBLEMS, HOW DIFFICULT HAVE THESE PROBLEMS MADE IT FOR YOU TO DO YOUR WORK, TAKE CARE OF THINGS AT HOME, OR GET ALONG WITH OTHER PEOPLE: 1
3. TROUBLE FALLING OR STAYING ASLEEP: 0
SUM OF ALL RESPONSES TO PHQ QUESTIONS 1-9: 4
SUM OF ALL RESPONSES TO PHQ QUESTIONS 1-9: 4
9. THOUGHTS THAT YOU WOULD BE BETTER OFF DEAD, OR OF HURTING YOURSELF: 0
SUM OF ALL RESPONSES TO PHQ QUESTIONS 1-9: 4
7. TROUBLE CONCENTRATING ON THINGS, SUCH AS READING THE NEWSPAPER OR WATCHING TELEVISION: 1
SUM OF ALL RESPONSES TO PHQ9 QUESTIONS 1 & 2: 0
6. FEELING BAD ABOUT YOURSELF - OR THAT YOU ARE A FAILURE OR HAVE LET YOURSELF OR YOUR FAMILY DOWN: 0
4. FEELING TIRED OR HAVING LITTLE ENERGY: 1
5. POOR APPETITE OR OVEREATING: 0
8. MOVING OR SPEAKING SO SLOWLY THAT OTHER PEOPLE COULD HAVE NOTICED. OR THE OPPOSITE, BEING SO FIGETY OR RESTLESS THAT YOU HAVE BEEN MOVING AROUND A LOT MORE THAN USUAL: 2

## 2022-12-07 ASSESSMENT — ENCOUNTER SYMPTOMS
DIARRHEA: 0
VOMITING: 0
ABDOMINAL PAIN: 0
SINUS PAIN: 0
SHORTNESS OF BREATH: 0
CONSTIPATION: 0
WHEEZING: 0
NAUSEA: 0

## 2022-12-07 NOTE — PROGRESS NOTES
Guru Chambers was seen today for follow-up. Diagnoses and all orders for this visit:    Parkinson disease ([facility] [address]  -     Hepatitis C Antibody; Future  -     HIV 1/2 Ag/Ab, 4TH Generation,W Rflx Confirm; Future  -     CBC with Auto Differential; Future  -     Hemoglobin A1C; Future  -     TSH; Future  -     Lipid Panel; Future  -     PSA Screening; Future    Major depressive disorder, recurrent, mild (HCC)  -     Hepatitis C Antibody; Future  -     HIV 1/2 Ag/Ab, 4TH Generation,W Rflx Confirm; Future  -     CBC with Auto Differential; Future  -     Hemoglobin A1C; Future  -     TSH; Future  -     Lipid Panel; Future  -     PSA Screening; Future    Routine adult health maintenance  -     Hepatitis C Antibody; Future  -     HIV 1/2 Ag/Ab, 4TH Generation,W Rflx Confirm; Future  -     CBC with Auto Differential; Future  -     Hemoglobin A1C; Future  -     TSH; Future  -     Lipid Panel; Future  -     PSA Screening; Future    Screening for prostate cancer  -     Hepatitis C Antibody; Future  -     HIV 1/2 Ag/Ab, 4TH Generation,W Rflx Confirm; Future  -     CBC with Auto Differential; Future  -     Hemoglobin A1C; Future  -     TSH; Future  -     Lipid Panel; Future  -     PSA Screening;  Future        Dexter Bailon is a 46 y.o. male    Chief Complaint   Patient presents with    Follow-up     Check up and review labs elevated LDL   Labs pretty good  C/o some worsening gait,parkinsin,fatigue etc.    Seeing neurology, execrise  level ok,some hiking,      Nurse Only on 11/30/2022   Component Date Value Ref Range Status    Cholesterol, Total 11/30/2022 169  <200 MG/DL Final    Comment: Borderline High: 200-239 mg/dL  High: Greater than or equal to 240 mg/dL      Triglycerides 11/30/2022 54  35 - 150 MG/DL Final    Comment: Borderline High: 150-199 mg/dL, High: 200-499 mg/dL  Very High: Greater than or equal to 500 mg/dL      HDL 11/30/2022 40  40 - 60 MG/DL Final    LDL Calculated 11/30/2022 118.2 (A)  <100 MG/DL Final Comment: Near Optimal: 100-129 mg/dL  Borderline High: 130-159, High: 160-189 mg/dL  Very High: Greater than or equal to 190 mg/dL      VLDL Cholesterol Calculated 11/30/2022 10.8  6.0 - 23.0 MG/DL Final    Chol/HDL Ratio 11/30/2022 4.2    Final    IgG, Serum 11/30/2022 1,231  603 - 1,613 mg/dL Final    IgA 11/30/2022 227  90 - 386 mg/dL Final    IgM 11/30/2022 43  20 - 172 mg/dL Final    Total Protein 11/30/2022 6.6  6.0 - 8.5 g/dL Final    Albumin 11/30/2022 3.9  2.9 - 4.4 g/dL Final    Alpha 1 Globulin 11/30/2022 0.2  0.0 - 0.4 g/dL Final    Alpha 2 Globulin 11/30/2022 0.5  0.4 - 1.0 g/dL Final    BETA GLOBULIN 11/30/2022 0.8  0.7 - 1.3 g/dL Final    GAMMA GLOBULIN 11/30/2022 1.1  0.4 - 1.8 g/dL Final    M-Deonte 11/30/2022 Not Observed  Not Observed g/dL Final    Globulin 11/30/2022 2.7  2.2 - 3.9 g/dL Final    A/G Ratio 11/30/2022 1.5  0.7 - 1.7   Final    IMMUNOFIXATION RESULT 11/30/2022 Comment    Final    Comment: (NOTE)  The immunofixation pattern appears unremarkable. Evidence of  monoclonal protein is not apparent.       TSH, 3RD GENERATION 11/30/2022 1.450  0.358 - 3.740 uIU/mL Final    Hemoglobin A1C 11/30/2022 5.2  4.8 - 5.6 % Final    eAG 11/30/2022 103  mg/dL Final    Comment: Reference Range  Normal: 4.8-5.6  Diabetic >=6.5%  Normal       <5.7%      WBC 11/30/2022 5.6  4.3 - 11.1 K/uL Final    RBC 11/30/2022 5.28  4.23 - 5.6 M/uL Final    Hemoglobin 11/30/2022 16.1  13.6 - 17.2 g/dL Final    Hematocrit 11/30/2022 49.2  41.1 - 50.3 % Final    MCV 11/30/2022 93.2  82 - 102 FL Final    MCH 11/30/2022 30.5  26.1 - 32.9 PG Final    MCHC 11/30/2022 32.7  31.4 - 35.0 g/dL Final    RDW 11/30/2022 12.2  11.9 - 14.6 % Final    Platelets 24/68/4231 220  150 - 450 K/uL Final    MPV 11/30/2022 10.0  9.4 - 12.3 FL Final    nRBC 11/30/2022 0.00  0.0 - 0.2 K/uL Final    **Note: Absolute NRBC parameter is now reported with Hemogram**    Differential Type 11/30/2022 AUTOMATED    Final    Seg Neutrophils 11/30/2022 47 43 - 78 % Final    Lymphocytes 11/30/2022 38  13 - 44 % Final    Monocytes 11/30/2022 10  4.0 - 12.0 % Final    Eosinophils % 11/30/2022 3  0.5 - 7.8 % Final    Basophils 11/30/2022 2  0.0 - 2.0 % Final    Immature Granulocytes 11/30/2022 0  0.0 - 5.0 % Final    Segs Absolute 11/30/2022 2.6  1.7 - 8.2 K/UL Final    Absolute Lymph # 11/30/2022 2.1  0.5 - 4.6 K/UL Final    Absolute Mono # 11/30/2022 0.5  0.1 - 1.3 K/UL Final    Absolute Eos # 11/30/2022 0.2  0.0 - 0.8 K/UL Final    Basophils Absolute 11/30/2022 0.1  0.0 - 0.2 K/UL Final    Absolute Immature Granulocyte 11/30/2022 0.0  0.0 - 0.5 K/UL Final        Past Medical History:   Diagnosis Date    Depression     Parkinson disease ([facility] [address] 2009       Family History   Problem Relation Age of Onset    Obesity Brother     Depression Father     Depression Mother     Other Father         precanerous polyps removed        Social History     Socioeconomic History    Marital status:      Spouse name: Not on file    Number of children: Not on file    Years of education: Not on file    Highest education level: Not on file   Occupational History    Not on file   Tobacco Use    Smoking status: Never    Smokeless tobacco: Former     Quit date: 1/1/1988   Substance and Sexual Activity    Alcohol use: Yes    Drug use: Not Currently    Sexual activity: Not on file   Other Topics Concern    Not on file   Social History Narrative    Not on file     Social Determinants of Health     Financial Resource Strain: Not on file   Food Insecurity: Not on file   Transportation Needs: Not on file   Physical Activity: Not on file   Stress: Not on file   Social Connections: Not on file   Intimate Partner Violence: Not on file   Housing Stability: Not on file         Current Outpatient Medications:     sertraline (ZOLOFT) 50 MG tablet, Take 1 tablet by mouth daily, Disp: 30 tablet, Rfl: 11    carbidopa-levodopa (SINEMET)  MG per tablet, Take 1 tablet by mouth 4 times daily, Disp: 120 tablet, Rfl: 5    donepezil (ARICEPT) 5 MG tablet, Take 1 tablet by mouth nightly, Disp: 30 tablet, Rfl: 5    amantadine (SYMMETREL) 100 MG capsule, Take 100 mg by mouth 3 times daily, Disp: , Rfl:     rOPINIRole (REQUIP) 0.5 MG tablet, Take 0.5 mg by mouth nightly, Disp: , Rfl:     No Known Allergies      Review of Systems   Constitutional:  Positive for fatigue. Negative for appetite change, chills and fever. HENT:  Negative for sinus pain. Respiratory:  Negative for shortness of breath and wheezing. Cardiovascular:  Negative for chest pain. Gastrointestinal:  Negative for abdominal pain, constipation, diarrhea, nausea and vomiting. Genitourinary:  Negative for dysuria and frequency. Musculoskeletal:  Negative for myalgias. Neurological:  Positive for tremors and weakness. Negative for dizziness. Hematological:  Bruises/bleeds easily. Psychiatric/Behavioral:  Positive for hallucinations. Negative for suicidal ideas. All other systems reviewed and are negative. Vitals:    12/07/22 0906   BP: 102/66   Pulse: 82   SpO2: 96%   Weight: 166 lb (75.3 kg)   Height: 5' 10\" (1.778 m)           Physical Exam  Constitutional:       Appearance: He is not ill-appearing. Cardiovascular:      Rate and Rhythm: Normal rate. Pulmonary:      Effort: Pulmonary effort is normal.      Breath sounds: Normal breath sounds. Abdominal:      General: There is no distension. Skin:     Coloration: Skin is not jaundiced. Neurological:      General: No focal deficit present. Mental Status: He is alert. Mental status is at baseline. Motor: Tremor and abnormal muscle tone present. Psychiatric:         Mood and Affect: Mood normal.         Thought Content: Thought content normal.      Counsele increase increase exercise,low fat  Routine maintenance    Nita Waters was seen today for follow-up. Diagnoses and all orders for this visit:    Parkinson disease ([facility] [address]  -     Hepatitis C Antibody;  Future  - HIV 1/2 Ag/Ab, 4TH Generation,W Rflx Confirm; Future  -     CBC with Auto Differential; Future  -     Hemoglobin A1C; Future  -     TSH; Future  -     Lipid Panel; Future  -     PSA Screening; Future    Major depressive disorder, recurrent, mild (HCC)  -     Hepatitis C Antibody; Future  -     HIV 1/2 Ag/Ab, 4TH Generation,W Rflx Confirm; Future  -     CBC with Auto Differential; Future  -     Hemoglobin A1C; Future  -     TSH; Future  -     Lipid Panel; Future  -     PSA Screening; Future    Routine adult health maintenance  -     Hepatitis C Antibody; Future  -     HIV 1/2 Ag/Ab, 4TH Generation,W Rflx Confirm; Future  -     CBC with Auto Differential; Future  -     Hemoglobin A1C; Future  -     TSH; Future  -     Lipid Panel; Future  -     PSA Screening; Future    Screening for prostate cancer  -     Hepatitis C Antibody; Future  -     HIV 1/2 Ag/Ab, 4TH Generation,W Rflx Confirm; Future  -     CBC with Auto Differential; Future  -     Hemoglobin A1C; Future  -     TSH; Future  -     Lipid Panel; Future  -     PSA Screening;  Future               Catrina Garcia DO

## 2022-12-08 ENCOUNTER — OFFICE VISIT (OUTPATIENT)
Dept: NEUROLOGY | Age: 52
End: 2022-12-08

## 2022-12-08 VITALS
DIASTOLIC BLOOD PRESSURE: 78 MMHG | SYSTOLIC BLOOD PRESSURE: 118 MMHG | HEART RATE: 84 BPM | WEIGHT: 166.8 LBS | BODY MASS INDEX: 23.88 KG/M2 | HEIGHT: 70 IN | OXYGEN SATURATION: 95 %

## 2022-12-08 DIAGNOSIS — G20 PARKINSON'S DISEASE (HCC): Primary | ICD-10-CM

## 2022-12-08 DIAGNOSIS — F02.80 DEMENTIA DUE TO PARKINSON'S DISEASE WITHOUT BEHAVIORAL DISTURBANCE (HCC): ICD-10-CM

## 2022-12-08 DIAGNOSIS — G20 DEMENTIA DUE TO PARKINSON'S DISEASE WITHOUT BEHAVIORAL DISTURBANCE (HCC): ICD-10-CM

## 2022-12-08 RX ORDER — ROPINIROLE 0.5 MG/1
0.5 TABLET, FILM COATED ORAL NIGHTLY
Qty: 30 TABLET | Refills: 11 | Status: SHIPPED | OUTPATIENT
Start: 2022-12-08 | End: 2023-12-03

## 2022-12-08 RX ORDER — AMANTADINE HYDROCHLORIDE 100 MG/1
100 CAPSULE, GELATIN COATED ORAL 3 TIMES DAILY
Qty: 90 CAPSULE | Refills: 11 | Status: SHIPPED | OUTPATIENT
Start: 2022-12-08

## 2022-12-08 RX ORDER — DONEPEZIL HYDROCHLORIDE 10 MG/1
10 TABLET, FILM COATED ORAL NIGHTLY
Qty: 30 TABLET | Refills: 11 | Status: SHIPPED | OUTPATIENT
Start: 2022-12-08

## 2022-12-08 ASSESSMENT — ENCOUNTER SYMPTOMS
VOICE CHANGE: 1
COUGH: 0
CONSTIPATION: 1

## 2022-12-08 NOTE — PROGRESS NOTES
Jennifer Ferro   [address], [address] [facility]  Phone: [phone] Fax [phone]  Dipika Arroyo MD      Patient: Mary Hansen  Provider: Dipika Arroyo MD    CC:   Chief Complaint   Patient presents with    Follow-up     Parkinson's Disease     Referring Provider:    History of Present Illness:     Mary Hansen is a 46 y.o. RH male who presents for follow up of Parkinson's disease. He is unaccompanied for today's visit. He was last seen June 2022. He presents for follow-up and continued management of Parkinson's disease, diagnosed in approximately 2009 with tremor in his left upper extremity. He has been followed at [address], undergoing a bilateral STN DBS ([employer]) in May 2017 for his bilateral tremor. He is an INTREPID study participant. Has continued follow up at [address]. He established care with our group in December 2019. Current medications include:  Carbidopa levodopa  mg 1/2 tablet 4 times a day  Sertraline 50 mg daily  Amantadine 100 mg three times a day  Requip 0.5 mg HS PRN      Previous medication trials include: N/A    He presents today for follow-up. He has finished his study visits at [address]. No significant adjustments to his DBS have been made since his last visit. In the past he has done some minor adjustments to address worsening motor symptoms which include tremor and rigidity. There have been some limitations due to stimulation related adverse effects of dyskinesias with DBS stimulation and other effects such as a sensation of tightness and pulling in the face. There has been some changes in his gait with other moments of hesitation particularly in doorways and with turns. He does note some generalized slowing of his gait. He has a tendency to posture with his left hand when walking. Continues to take levodopa as noted above and we did increase the dose at the last visit with some mild additional benefit.   He appears to be tolerating relatively well with no adverse effects. He does have some worsening cognitive complaints but the addition of donepezil has been beneficial.  Appears to have more difficulty with executive function and slower processing speed. He feels it takes him longer to do things and this is a source of frustration for him. He notes frequent word finding difficulty. There continue to be no visual hallucinations and appears to be sleeping well. He continues to take sertraline for his mood and this has been relatively stable. Review of Systems:   Review of Systems   Constitutional:  Negative for fever. HENT:  Positive for voice change. Negative for congestion. Eyes:  Negative for visual disturbance. Respiratory:  Negative for cough. Cardiovascular:  Negative for chest pain. Gastrointestinal:  Positive for constipation. Genitourinary:  Negative for dysuria. Musculoskeletal:  Positive for gait problem. Skin:  Negative for rash. Neurological:  Positive for tremors and weakness. Psychiatric/Behavioral:  Positive for confusion (memory loss) and decreased concentration. Negative for hallucinations. Lab/Imaging Review:   I REVIEWED PERTINENT LABS, IMAGES, AND REPORTS WITH THE PATIENT PERSONALLY, DIRECTLY AND FULLY. THE MOST PERTINENT FINDINGS ARE NOTED BELOW:    MRI Brain April 2017:  FINDINGS:   Diffusion imaging shows no hyperacute, acute, or early subacute infarction. Normal supratentorial signal. No definite volume loss of the brainstem or cerebellum. There is no mass or mass effect, or abnormal extra-axial collection. The ventricles are normal in size, shape and position. The paranasal sinuses and the mastoid air cells are predominantly clear. IMPRESSION:   Normal supratentorial and infratentorial signal. No definite volume loss of the brainstem or cerebellum. MR study performed as a pre-DBS protocol.      CT Head June 2017:   FINDINGS:    Newly placed deep brain stimulator electrodes terminate in the region of the subthalamic nuclei. No evidence of intracranial hemorrhage or extra-axial collection. The ventricles are normal in size. The density of the dural venous sinuses is normal. The skull base and calvarium are normal. The included paranasal sinuses and mastoid air cells are predominantly clear. The visualized orbits are unremarkable. Past Medical History:     Past medical history, surgical history, social history, family history, medications, and allergies were reviewed and updated as appropriate.      PAST MEDICAL HISTORY:  Past Medical History:   Diagnosis Date    Depression     Parkinson disease ([address] 2009     PAST SURGICAL HISTORY:   Past Surgical History:   Procedure Laterality Date    HERNIA REPAIR  05/24/2021    Robotic RIHR/UHR    OTHER SURGICAL HISTORY  2017    DBS-implant    OTHER SURGICAL HISTORY  1988, 2016    neck, fused vertrbrae     FAMILY HISTORY:  Family History   Problem Relation Age of Onset    Obesity Brother     Depression Father     Depression Mother     Other Father         precanerous polyps removed      SOCIAL HISTORY:  Social History     Socioeconomic History    Marital status:      Spouse name: None    Number of children: None    Years of education: None    Highest education level: None   Tobacco Use    Smoking status: Never    Smokeless tobacco: Former     Quit date: 1/1/1988   Substance and Sexual Activity    Alcohol use: Yes    Drug use: Not Currently       Medications/Allergies:     MEDICATIONS:   Outpatient Encounter Medications as of 12/8/2022   Medication Sig Dispense Refill    rOPINIRole (REQUIP) 0.5 MG tablet Take 1 tablet by mouth nightly 30 tablet 11    carbidopa-levodopa (SINEMET)  MG per tablet Take 1 tablet by mouth 4 times daily 360 tablet 3    donepezil (ARICEPT) 10 MG tablet Take 1 tablet by mouth nightly 30 tablet 11    amantadine (SYMMETREL) 100 MG capsule Take 1 capsule by mouth 3 times daily 90 capsule 11    sertraline (ZOLOFT) 50 MG tablet Take 1 tablet by mouth daily 30 tablet 11    [DISCONTINUED] carbidopa-levodopa (SINEMET)  MG per tablet Take 1 tablet by mouth 4 times daily 120 tablet 5    [DISCONTINUED] donepezil (ARICEPT) 5 MG tablet Take 1 tablet by mouth nightly 30 tablet 5    [DISCONTINUED] amantadine (SYMMETREL) 100 MG capsule Take 100 mg by mouth 3 times daily      [DISCONTINUED] rOPINIRole (REQUIP) 0.5 MG tablet Take 0.5 mg by mouth nightly       No facility-administered encounter medications on file as of 12/8/2022. ALLERGIES:  No Known Allergies      Physical Exam:     /78 (Site: Left Upper Arm, Position: Sitting)   Pulse 84   Ht 5' 10\" (1.778 m)   Wt 166 lb 12.8 oz (75.7 kg)   SpO2 95%   BMI 23.93 kg/m²     General Exam:  General - Well developed, well nourished, in no apparent distress. HEENT - Normocephalic, atraumatic. Sclera anicteric. Oropharynx clear. Neck - Supple without masses  Cardiovascular - Regular rate and rhythm. No carotid bruits. Lungs - Non-labored breathing. Abdomen - Soft, nontender, nondistended. Extremities - Peripheral pulses intact. No edema and no rashes. Neurological Exam:      MS/Language/Speech - Alert. Oriented to person, place, and time. Attention intact. Language fluent. Speech with mild hypophonia. Cranial Nerves - PERRL. Eye movements full with saccadic pursuits. No nystagmus. There is diminished facial expression but activation was symmetric and sensation normal. There is slight rightward neck shift. Tongue and palate were midline. Shoulder shrug symmetric. Motor - No resting tremor appreciated but has been noted in the past slightly worse on the left. Rapid alternating movements show mild to moderate slowing of the right upper extremity and more moderate slowing in the left upper extremity. Foot tapping is slowed bilaterally.   There is a mild to moderate increase in tone bilaterally again slightly worse on the left.     Sensory - Normal to light touch throughout. Cerebellar - No ataxia or dysmetria. Reflexes (R/L): Biceps (2+/2+), Triceps (2+/2+), Brachioradialis (2+/2+), Patellar (2+/2+), Ankle (0+/0+)     Gait - Able to rise from a seated position without use of his arms. Posture is mildly stooped. Romberg was normal. He walks with a narrow base and reduced stride length bilaterally particularly with the left lower extremity and there may be brief moments of hesitation. There is diminished arm swing noted bilaterally. Postural reflexes are impaired. DBS Programming:     His [employer] DBS was interrogated. Impedances were within normal limits. Original Settings:   L STN              Level 3 (100%)                                   2.3 mA / 40 / 176     R STN             Level 11 (50%) 12 (50%)                    2.2 mA / 70 / 176    New Settings:   L STN              Level 3 (100%)                                   2.4 mA / 40 / 176     R STN             Level 11 (50%) 12 (50%)                    2.4 mA / 70 / 176     Comments:  DBS was interrogated. Slight increases in amplitude bilaterally. Changes were met with improved bradykinesia and rigidity. Slight paresthesias in the left upper extremity which improved over time. Total DBS programming time: 25 minutes      Assessment and Plan:     Mary Hansen is a 46 y.o. male who presents with the following issues:     Rickey Armstrong was seen today for follow-up. Diagnoses and all orders for this visit:    Parkinson's disease ([facility] [address] [facility]  -     rOPINIRole (REQUIP) 0.5 MG tablet; Take 1 tablet by mouth nightly  -     carbidopa-levodopa (SINEMET)  MG per tablet; Take 1 tablet by mouth 4 times daily  -     amantadine (SYMMETREL) 100 MG capsule; Take 1 capsule by mouth 3 times daily    Dementia due to Parkinson's disease without behavioral disturbance (HCC)  -     donepezil (ARICEPT) 10 MG tablet;  Take 1 tablet by mouth nightly      Patient presents for follow-up of Parkinson's disease s/p bilateral STN DBS complicated by resting tremor, motor fluctuations related to use of levodopa, levodopa-induced dyskinesias, gait disturbances, and mild cognitive impairment. He recently finished the clinical trial (INTREPID) study at [address]. DBS was successfully interrogated today with changes made as noted above. Increase donepezil to 10 mg at night for cognitive dysfunction likely consistent with mild PD dementia. Continue to monitor for any worsening cognitive symptoms or any psychotic symptoms. Continue Sinemet  mg to 1 tablet 4 times a day in order to address motor fluctuations and worsening mobility. Continue to monitor for any worsening dyskinesias particularly as we increase settings on his DBS. Discussed referrals to physical therapy for his gait/mobility as well as speech therapy for his hypophonia. He has deferred for now but will let us know if he reconsiders. He will continue amantadine as prescribed which is also helpful for dyskinesias. He may continue Requip at night as needed for restless leg symptoms. We will continue sertraline 50 mg daily for his mood. We will see him back in approximately 6 months. Signature: Helga Bull MD      Date:  12/9/2022    [facility]   [address] [place] [address]  Ph: [phone]  Fax: [phone]         I have personally interviewed and examined Mr. Adams Silvestre and I have personally reviewed all relevant records including labs and imaging as noted above. I have written all aspects of this note. More than 50% of this time was used for counseling regarding my diagnosis, prognosis, and plans for management. DBS Programming Time: 25 minutes  Total visit time: 70 minutes.

## 2023-03-15 ENCOUNTER — OFFICE VISIT (OUTPATIENT)
Dept: INTERNAL MEDICINE CLINIC | Facility: CLINIC | Age: 53
End: 2023-03-15
Payer: COMMERCIAL

## 2023-03-15 VITALS
SYSTOLIC BLOOD PRESSURE: 90 MMHG | HEIGHT: 70 IN | TEMPERATURE: 97.8 F | WEIGHT: 166 LBS | BODY MASS INDEX: 23.77 KG/M2 | OXYGEN SATURATION: 97 % | HEART RATE: 82 BPM | DIASTOLIC BLOOD PRESSURE: 60 MMHG

## 2023-03-15 DIAGNOSIS — M48.9 CERVICAL SPINE DISEASE: Primary | ICD-10-CM

## 2023-03-15 DIAGNOSIS — M54.2 NECK PAIN, CHRONIC: ICD-10-CM

## 2023-03-15 DIAGNOSIS — G89.29 NECK PAIN, CHRONIC: ICD-10-CM

## 2023-03-15 DIAGNOSIS — M54.2 NECK PAIN, ACUTE: ICD-10-CM

## 2023-03-15 PROCEDURE — 99213 OFFICE O/P EST LOW 20 MIN: CPT | Performed by: INTERNAL MEDICINE

## 2023-03-15 RX ORDER — PREDNISONE 20 MG/1
TABLET ORAL
Qty: 19 TABLET | Refills: 0 | Status: SHIPPED | OUTPATIENT
Start: 2023-03-15

## 2023-03-15 RX ORDER — IBUPROFEN 800 MG/1
800 TABLET ORAL
Qty: 30 TABLET | Refills: 1 | Status: SHIPPED | OUTPATIENT
Start: 2023-03-15

## 2023-03-15 SDOH — ECONOMIC STABILITY: HOUSING INSECURITY
IN THE LAST 12 MONTHS, WAS THERE A TIME WHEN YOU DID NOT HAVE A STEADY PLACE TO SLEEP OR SLEPT IN A SHELTER (INCLUDING NOW)?: PATIENT REFUSED

## 2023-03-15 SDOH — ECONOMIC STABILITY: INCOME INSECURITY: HOW HARD IS IT FOR YOU TO PAY FOR THE VERY BASICS LIKE FOOD, HOUSING, MEDICAL CARE, AND HEATING?: PATIENT DECLINED

## 2023-03-15 SDOH — ECONOMIC STABILITY: TRANSPORTATION INSECURITY
IN THE PAST 12 MONTHS, HAS LACK OF TRANSPORTATION KEPT YOU FROM MEETINGS, WORK, OR FROM GETTING THINGS NEEDED FOR DAILY LIVING?: PATIENT DECLINED

## 2023-03-15 SDOH — ECONOMIC STABILITY: FOOD INSECURITY: WITHIN THE PAST 12 MONTHS, THE FOOD YOU BOUGHT JUST DIDN'T LAST AND YOU DIDN'T HAVE MONEY TO GET MORE.: PATIENT DECLINED

## 2023-03-15 SDOH — ECONOMIC STABILITY: FOOD INSECURITY: WITHIN THE PAST 12 MONTHS, YOU WORRIED THAT YOUR FOOD WOULD RUN OUT BEFORE YOU GOT MONEY TO BUY MORE.: PATIENT DECLINED

## 2023-03-15 ASSESSMENT — ENCOUNTER SYMPTOMS: SHORTNESS OF BREATH: 0

## 2023-03-15 ASSESSMENT — PATIENT HEALTH QUESTIONNAIRE - PHQ9
4. FEELING TIRED OR HAVING LITTLE ENERGY: 1
SUM OF ALL RESPONSES TO PHQ QUESTIONS 1-9: 5
7. TROUBLE CONCENTRATING ON THINGS, SUCH AS READING THE NEWSPAPER OR WATCHING TELEVISION: 1
2. FEELING DOWN, DEPRESSED OR HOPELESS: 0
3. TROUBLE FALLING OR STAYING ASLEEP: 0
6. FEELING BAD ABOUT YOURSELF - OR THAT YOU ARE A FAILURE OR HAVE LET YOURSELF OR YOUR FAMILY DOWN: 0
1. LITTLE INTEREST OR PLEASURE IN DOING THINGS: 0
SUM OF ALL RESPONSES TO PHQ QUESTIONS 1-9: 5
SUM OF ALL RESPONSES TO PHQ9 QUESTIONS 1 & 2: 0
SUM OF ALL RESPONSES TO PHQ QUESTIONS 1-9: 5
5. POOR APPETITE OR OVEREATING: 0
8. MOVING OR SPEAKING SO SLOWLY THAT OTHER PEOPLE COULD HAVE NOTICED. OR THE OPPOSITE, BEING SO FIGETY OR RESTLESS THAT YOU HAVE BEEN MOVING AROUND A LOT MORE THAN USUAL: 3
10. IF YOU CHECKED OFF ANY PROBLEMS, HOW DIFFICULT HAVE THESE PROBLEMS MADE IT FOR YOU TO DO YOUR WORK, TAKE CARE OF THINGS AT HOME, OR GET ALONG WITH OTHER PEOPLE: 1
SUM OF ALL RESPONSES TO PHQ QUESTIONS 1-9: 5
9. THOUGHTS THAT YOU WOULD BE BETTER OFF DEAD, OR OF HURTING YOURSELF: 0

## 2023-03-15 NOTE — PROGRESS NOTES
There are no diagnoses linked to this encounter.      Deven Coyne is a 52 y.o. male    Chief Complaint   Patient presents with    Neck Pain       Back left,tender, some popping sound    Hurts worse turning it.  Lt>rt  Hurts lying down rolling over  Previous sx    Nurse Only on 11/30/2022   Component Date Value Ref Range Status    Cholesterol, Total 11/30/2022 169  <200 MG/DL Final    Comment: Borderline High: 200-239 mg/dL  High: Greater than or equal to 240 mg/dL      Triglycerides 11/30/2022 54  35 - 150 MG/DL Final    Comment: Borderline High: 150-199 mg/dL, High: 200-499 mg/dL  Very High: Greater than or equal to 500 mg/dL      HDL 11/30/2022 40  40 - 60 MG/DL Final    LDL Calculated 11/30/2022 118.2 (A)  <100 MG/DL Final    Comment: Near Optimal: 100-129 mg/dL  Borderline High: 130-159, High: 160-189 mg/dL  Very High: Greater than or equal to 190 mg/dL      VLDL Cholesterol Calculated 11/30/2022 10.8  6.0 - 23.0 MG/DL Final    Chol/HDL Ratio 11/30/2022 4.2    Final    IgG, Serum 11/30/2022 1,231  603 - 1,613 mg/dL Final    IgA 11/30/2022 227  90 - 386 mg/dL Final    IgM 11/30/2022 43  20 - 172 mg/dL Final    Total Protein 11/30/2022 6.6  6.0 - 8.5 g/dL Final    Albumin 11/30/2022 3.9  2.9 - 4.4 g/dL Final    Alpha 1 Globulin 11/30/2022 0.2  0.0 - 0.4 g/dL Final    Alpha 2 Globulin 11/30/2022 0.5  0.4 - 1.0 g/dL Final    BETA GLOBULIN 11/30/2022 0.8  0.7 - 1.3 g/dL Final    GAMMA GLOBULIN 11/30/2022 1.1  0.4 - 1.8 g/dL Final    M-Deonte 11/30/2022 Not Observed  Not Observed g/dL Final    Globulin 11/30/2022 2.7  2.2 - 3.9 g/dL Final    A/G Ratio 11/30/2022 1.5  0.7 - 1.7   Final    IMMUNOFIXATION RESULT 11/30/2022 Comment    Final    Comment: (NOTE)  The immunofixation pattern appears unremarkable. Evidence of  monoclonal protein is not apparent.      TSH, 3RD GENERATION 11/30/2022 1.450  0.358 - 3.740 uIU/mL Final    Hemoglobin A1C 11/30/2022 5.2  4.8 - 5.6 % Final    eAG 11/30/2022 103  mg/dL Final     Comment: Reference Range  Normal: 4.8-5.6  Diabetic >=6.5%  Normal       <5.7%      WBC 11/30/2022 5.6  4.3 - 11.1 K/uL Final    RBC 11/30/2022 5.28  4.23 - 5.6 M/uL Final    Hemoglobin 11/30/2022 16.1  13.6 - 17.2 g/dL Final    Hematocrit 11/30/2022 49.2  41.1 - 50.3 % Final    MCV 11/30/2022 93.2  82 - 102 FL Final    MCH 11/30/2022 30.5  26.1 - 32.9 PG Final    MCHC 11/30/2022 32.7  31.4 - 35.0 g/dL Final    RDW 11/30/2022 12.2  11.9 - 14.6 % Final    Platelets 07/52/0348 220  150 - 450 K/uL Final    MPV 11/30/2022 10.0  9.4 - 12.3 FL Final    nRBC 11/30/2022 0.00  0.0 - 0.2 K/uL Final    **Note: Absolute NRBC parameter is now reported with Hemogram**    Differential Type 11/30/2022 AUTOMATED    Final    Seg Neutrophils 11/30/2022 47  43 - 78 % Final    Lymphocytes 11/30/2022 38  13 - 44 % Final    Monocytes 11/30/2022 10  4.0 - 12.0 % Final    Eosinophils % 11/30/2022 3  0.5 - 7.8 % Final    Basophils 11/30/2022 2  0.0 - 2.0 % Final    Immature Granulocytes 11/30/2022 0  0.0 - 5.0 % Final    Segs Absolute 11/30/2022 2.6  1.7 - 8.2 K/UL Final    Absolute Lymph # 11/30/2022 2.1  0.5 - 4.6 K/UL Final    Absolute Mono # 11/30/2022 0.5  0.1 - 1.3 K/UL Final    Absolute Eos # 11/30/2022 0.2  0.0 - 0.8 K/UL Final    Basophils Absolute 11/30/2022 0.1  0.0 - 0.2 K/UL Final    Absolute Immature Granulocyte 11/30/2022 0.0  0.0 - 0.5 K/UL Final        Past Medical History:   Diagnosis Date    Depression     Parkinson disease ([facility] [address] 2009       Family History   Problem Relation Age of Onset    Obesity Brother     Depression Father     Depression Mother     Other Father         precanerous polyps removed        Social History     Socioeconomic History    Marital status:      Spouse name: Not on file    Number of children: Not on file    Years of education: Not on file    Highest education level: Not on file   Occupational History    Not on file   Tobacco Use    Smoking status: Never    Smokeless tobacco: Former     Quit date: 1/1/1988   Substance and Sexual Activity    Alcohol use: Yes    Drug use: Not Currently    Sexual activity: Not on file   Other Topics Concern    Not on file   Social History Narrative    Not on file     Social Determinants of Health     Financial Resource Strain: Unknown    Difficulty of Paying Living Expenses: Patient refused   Food Insecurity: Unknown    Worried About Running Out of Food in the Last Year: Patient refused    [address] in the Last Year: Patient refused   Transportation Needs: Unknown    Lack of Transportation (Medical): Not on file    Lack of Transportation (Non-Medical): Patient refused   Physical Activity: Not on file   Stress: Not on file   Social Connections: Not on file   Intimate Partner Violence: Not on file   Housing Stability: Unknown    Unable to Pay for Housing in the Last Year: Not on file    Number of [place] in the Last Year: Not on file    Unstable Housing in the Last Year: Patient refused         Current Outpatient Medications:     rOPINIRole (REQUIP) 0.5 MG tablet, Take 1 tablet by mouth nightly, Disp: 30 tablet, Rfl: 11    carbidopa-levodopa (SINEMET)  MG per tablet, Take 1 tablet by mouth 4 times daily, Disp: 360 tablet, Rfl: 3    donepezil (ARICEPT) 10 MG tablet, Take 1 tablet by mouth nightly, Disp: 30 tablet, Rfl: 11    amantadine (SYMMETREL) 100 MG capsule, Take 1 capsule by mouth 3 times daily, Disp: 90 capsule, Rfl: 11    sertraline (ZOLOFT) 50 MG tablet, Take 1 tablet by mouth daily, Disp: 30 tablet, Rfl: 11    No Known Allergies      Review of Systems   Constitutional:  Negative for fever. Respiratory:  Negative for shortness of breath. Cardiovascular:  Negative for chest pain. Musculoskeletal:  Positive for myalgias and neck pain. Skin:  Negative for rash.        Vitals:    03/15/23 1452   BP: 90/60   Pulse: 82   Temp: 97.8 °F (36.6 °C)   TempSrc: Temporal   SpO2: 97%   Weight: 166 lb (75.3 kg)   Height: 5' 10\" (1.778 m)           Physical Exam  Constitutional:       Appearance: He is not ill-appearing. Eyes:      Extraocular Movements: Extraocular movements intact. Cardiovascular:      Rate and Rhythm: Normal rate. Pulmonary:      Effort: Pulmonary effort is normal. No respiratory distress. Musculoskeletal:      Cervical back: Neck supple. Right lower leg: No edema. Left lower leg: No edema. Skin:     Findings: No lesion or rash. Neurological:      General: No focal deficit present. Mental Status: He is alert. Mental status is at baseline. Motor: Abnormal muscle tone present. No weakness. Comments: Mild richard cogwheel   Psychiatric:         Thought Content: Thought content normal.          There are no diagnoses linked to this encounter.              Dara Valentin,

## 2023-05-16 ENCOUNTER — OFFICE VISIT (OUTPATIENT)
Dept: INTERNAL MEDICINE CLINIC | Facility: CLINIC | Age: 53
End: 2023-05-16
Payer: COMMERCIAL

## 2023-05-16 VITALS
HEIGHT: 70 IN | TEMPERATURE: 97.9 F | SYSTOLIC BLOOD PRESSURE: 102 MMHG | DIASTOLIC BLOOD PRESSURE: 68 MMHG | OXYGEN SATURATION: 97 % | HEART RATE: 68 BPM | WEIGHT: 170 LBS | BODY MASS INDEX: 24.34 KG/M2

## 2023-05-16 DIAGNOSIS — F33.0 MAJOR DEPRESSIVE DISORDER, RECURRENT, MILD (HCC): ICD-10-CM

## 2023-05-16 DIAGNOSIS — M48.9 CERVICAL SPINE DISEASE: ICD-10-CM

## 2023-05-16 DIAGNOSIS — G89.29 NECK PAIN, CHRONIC: Primary | ICD-10-CM

## 2023-05-16 DIAGNOSIS — G20 PARKINSON'S DISEASE (HCC): ICD-10-CM

## 2023-05-16 DIAGNOSIS — M54.2 NECK PAIN, ACUTE: ICD-10-CM

## 2023-05-16 DIAGNOSIS — M54.2 NECK PAIN, CHRONIC: Primary | ICD-10-CM

## 2023-05-16 DIAGNOSIS — G20 DEMENTIA DUE TO PARKINSON'S DISEASE WITHOUT BEHAVIORAL DISTURBANCE (HCC): ICD-10-CM

## 2023-05-16 DIAGNOSIS — F02.80 DEMENTIA DUE TO PARKINSON'S DISEASE WITHOUT BEHAVIORAL DISTURBANCE (HCC): ICD-10-CM

## 2023-05-16 PROCEDURE — 99213 OFFICE O/P EST LOW 20 MIN: CPT | Performed by: INTERNAL MEDICINE

## 2023-05-16 RX ORDER — PREDNISONE 20 MG/1
TABLET ORAL
Qty: 19 TABLET | Refills: 0 | Status: SHIPPED | OUTPATIENT
Start: 2023-05-16

## 2023-05-16 RX ORDER — CELECOXIB 200 MG/1
200 CAPSULE ORAL 2 TIMES DAILY
Qty: 60 CAPSULE | Refills: 5 | Status: SHIPPED | OUTPATIENT
Start: 2023-05-16

## 2023-05-16 ASSESSMENT — PATIENT HEALTH QUESTIONNAIRE - PHQ9
SUM OF ALL RESPONSES TO PHQ QUESTIONS 1-9: 5
5. POOR APPETITE OR OVEREATING: 0
10. IF YOU CHECKED OFF ANY PROBLEMS, HOW DIFFICULT HAVE THESE PROBLEMS MADE IT FOR YOU TO DO YOUR WORK, TAKE CARE OF THINGS AT HOME, OR GET ALONG WITH OTHER PEOPLE: 1
3. TROUBLE FALLING OR STAYING ASLEEP: 0
6. FEELING BAD ABOUT YOURSELF - OR THAT YOU ARE A FAILURE OR HAVE LET YOURSELF OR YOUR FAMILY DOWN: 0
1. LITTLE INTEREST OR PLEASURE IN DOING THINGS: 0
8. MOVING OR SPEAKING SO SLOWLY THAT OTHER PEOPLE COULD HAVE NOTICED. OR THE OPPOSITE, BEING SO FIGETY OR RESTLESS THAT YOU HAVE BEEN MOVING AROUND A LOT MORE THAN USUAL: 3
9. THOUGHTS THAT YOU WOULD BE BETTER OFF DEAD, OR OF HURTING YOURSELF: 0
2. FEELING DOWN, DEPRESSED OR HOPELESS: 0
SUM OF ALL RESPONSES TO PHQ QUESTIONS 1-9: 5
SUM OF ALL RESPONSES TO PHQ QUESTIONS 1-9: 5
4. FEELING TIRED OR HAVING LITTLE ENERGY: 2
SUM OF ALL RESPONSES TO PHQ QUESTIONS 1-9: 5
7. TROUBLE CONCENTRATING ON THINGS, SUCH AS READING THE NEWSPAPER OR WATCHING TELEVISION: 0
SUM OF ALL RESPONSES TO PHQ9 QUESTIONS 1 & 2: 0

## 2023-05-16 ASSESSMENT — ENCOUNTER SYMPTOMS: SHORTNESS OF BREATH: 0

## 2023-05-16 NOTE — PROGRESS NOTES
Jessica Adam was seen today for follow-up. Diagnoses and all orders for this visit:    Neck pain, chronic  -     Jamel Pimentel MD, Neurosurgery, [place]    Neck pain, acute  -     Jamel Pimentel MD, Neurosurgery, [place]    Cervical spine disease  -     Jamel Pimentel MD, Neurosurgery, [place]    Parkinson's disease [facility])    Major depressive disorder, recurrent, mild ([address]    Dementia due to Parkinson's disease without behavioral disturbance ([address]    Other orders  -     celecoxib (CELEBREX) 200 MG capsule;  Take 1 capsule by mouth 2 times daily  -     predniSONE (DELTASONE) 20 MG tablet; 1 tid x 2d,1 bid x 5d,1 daily x 3d      Has deep brain stimulator and previous cervical sx x 2 will refer nsx mri vs ct as next step    Kate Fournier is a 46 y.o. male    Chief Complaint   Patient presents with    Follow-up     Neck pain     Not getting better c pt constant pain with worsening when moves head    Nurse Only on 11/30/2022   Component Date Value Ref Range Status    Cholesterol, Total 11/30/2022 169  <200 MG/DL Final    Comment: Borderline High: 200-239 mg/dL  High: Greater than or equal to 240 mg/dL      Triglycerides 11/30/2022 54  35 - 150 MG/DL Final    Comment: Borderline High: 150-199 mg/dL, High: 200-499 mg/dL  Very High: Greater than or equal to 500 mg/dL      HDL 11/30/2022 40  40 - 60 MG/DL Final    LDL Calculated 11/30/2022 118.2 (H)  <100 MG/DL Final    Comment: Near Optimal: 100-129 mg/dL  Borderline High: 130-159, High: 160-189 mg/dL  Very High: Greater than or equal to 190 mg/dL      VLDL Cholesterol Calculated 11/30/2022 10.8  6.0 - 23.0 MG/DL Final    Chol/HDL Ratio 11/30/2022 4.2    Final    IgG, Serum 11/30/2022 1,231  603 - 1,613 mg/dL Final    IgA 11/30/2022 227  90 - 386 mg/dL Final    IgM 11/30/2022 43  20 - 172 mg/dL Final    Total Protein 11/30/2022 6.6  6.0 - 8.5 g/dL Final    Albumin 11/30/2022 3.9  2.9 - 4.4 g/dL Final    Alpha 1 Globulin 11/30/2022 0.2  0.0 -

## 2023-05-18 ENCOUNTER — PATIENT MESSAGE (OUTPATIENT)
Dept: NEUROLOGY | Age: 53
End: 2023-05-18

## 2023-05-18 DIAGNOSIS — Z74.09 IMPAIRED FUNCTIONAL MOBILITY, BALANCE, GAIT, AND ENDURANCE: ICD-10-CM

## 2023-05-18 DIAGNOSIS — G20 PARKINSON'S DISEASE (HCC): Primary | ICD-10-CM

## 2023-05-23 NOTE — TELEPHONE ENCOUNTER
Nuvia Light MA 5/22/2023 10:30 AM EDT      ----- Message -----  From: Santy Brennan  Sent: 5/18/2023 12:51 PM EDT  To: , *  Subject: Balance issues - cane? Hi Dr. Charan Marks. I had my first PD related fall about 6 weeks ago, and I am pretty unbalanced everyday, often catching myself from falling. Someone suggested getting a cane, and that a doctor could prescribe this. Would this be appropriate for me at this time? I don't think that I need it all the time, but it would probably be helpful some times.     Thanks,  [employer]

## 2023-05-25 ENCOUNTER — CLINICAL DOCUMENTATION (OUTPATIENT)
Dept: NEUROLOGY | Age: 53
End: 2023-05-25

## 2023-07-03 ENCOUNTER — OFFICE VISIT (OUTPATIENT)
Dept: NEUROSURGERY | Age: 53
End: 2023-07-03
Payer: COMMERCIAL

## 2023-07-03 VITALS
OXYGEN SATURATION: 97 % | SYSTOLIC BLOOD PRESSURE: 104 MMHG | DIASTOLIC BLOOD PRESSURE: 66 MMHG | HEART RATE: 73 BPM | TEMPERATURE: 98.2 F | HEIGHT: 70 IN | BODY MASS INDEX: 23.78 KG/M2 | WEIGHT: 166.13 LBS

## 2023-07-03 DIAGNOSIS — M54.2 NECK PAIN: Primary | ICD-10-CM

## 2023-07-03 DIAGNOSIS — G20 PARKINSON'S DISEASE (HCC): ICD-10-CM

## 2023-07-03 PROCEDURE — 99203 OFFICE O/P NEW LOW 30 MIN: CPT | Performed by: NURSE PRACTITIONER

## 2023-07-10 ENCOUNTER — HOSPITAL ENCOUNTER (OUTPATIENT)
Dept: CT IMAGING | Age: 53
Discharge: HOME | End: 2023-07-13
Payer: COMMERCIAL

## 2023-07-10 ENCOUNTER — HOSPITAL ENCOUNTER (OUTPATIENT)
Dept: INTERVENTIONAL RADIOLOGY/VASCULAR | Age: 53
Discharge: HOME | End: 2023-07-13
Payer: COMMERCIAL

## 2023-07-10 VITALS
HEART RATE: 70 BPM | SYSTOLIC BLOOD PRESSURE: 110 MMHG | HEIGHT: 70 IN | OXYGEN SATURATION: 95 % | TEMPERATURE: 97.6 F | DIASTOLIC BLOOD PRESSURE: 70 MMHG | BODY MASS INDEX: 23.77 KG/M2 | WEIGHT: 166 LBS | RESPIRATION RATE: 18 BRPM

## 2023-07-10 DIAGNOSIS — M54.2 NECK PAIN: ICD-10-CM

## 2023-07-10 PROCEDURE — 72126 CT NECK SPINE W/DYE: CPT

## 2023-07-10 PROCEDURE — 2500000003 HC RX 250 WO HCPCS: Performed by: PHYSICIAN ASSISTANT

## 2023-07-10 PROCEDURE — 6360000004 HC RX CONTRAST MEDICATION: Performed by: PHYSICIAN ASSISTANT

## 2023-07-10 PROCEDURE — 62302 MYELOGRAPHY LUMBAR INJECTION: CPT

## 2023-07-10 RX ORDER — LIDOCAINE HYDROCHLORIDE 20 MG/ML
INJECTION, SOLUTION INFILTRATION; PERINEURAL PRN
Status: DISCONTINUED | OUTPATIENT
Start: 2023-07-10 | End: 2023-07-14 | Stop reason: HOSPADM

## 2023-07-10 RX ADMIN — LIDOCAINE HYDROCHLORIDE 5 ML: 20 INJECTION, SOLUTION INFILTRATION; PERINEURAL at 12:10

## 2023-07-10 RX ADMIN — IOPAMIDOL 10 ML: 612 INJECTION, SOLUTION INTRATHECAL at 12:11

## 2023-07-10 ASSESSMENT — PAIN - FUNCTIONAL ASSESSMENT: PAIN_FUNCTIONAL_ASSESSMENT: 0-10

## 2023-07-11 ENCOUNTER — HOSPITAL ENCOUNTER (EMERGENCY)
Age: 53
Discharge: HOME | End: 2023-07-11
Attending: STUDENT IN AN ORGANIZED HEALTH CARE EDUCATION/TRAINING PROGRAM
Payer: COMMERCIAL

## 2023-07-11 VITALS
WEIGHT: 170 LBS | RESPIRATION RATE: 18 BRPM | BODY MASS INDEX: 24.34 KG/M2 | HEART RATE: 62 BPM | TEMPERATURE: 97.3 F | SYSTOLIC BLOOD PRESSURE: 116 MMHG | HEIGHT: 70 IN | DIASTOLIC BLOOD PRESSURE: 71 MMHG | OXYGEN SATURATION: 100 %

## 2023-07-11 DIAGNOSIS — G97.1 POST LUMBAR PUNCTURE HEADACHE: Primary | ICD-10-CM

## 2023-07-11 LAB
ALBUMIN SERPL-MCNC: 3.6 G/DL (ref 3.5–5)
ALBUMIN/GLOB SERPL: 1.1 (ref 0.4–1.6)
ALP SERPL-CCNC: 82 U/L (ref 50–136)
ALT SERPL-CCNC: 9 U/L (ref 12–65)
ANION GAP SERPL CALC-SCNC: 5 MMOL/L (ref 2–11)
AST SERPL-CCNC: 10 U/L (ref 15–37)
BASOPHILS # BLD: 0.1 K/UL (ref 0–0.2)
BASOPHILS NFR BLD: 1 % (ref 0–2)
BILIRUB SERPL-MCNC: 0.6 MG/DL (ref 0.2–1.1)
BUN SERPL-MCNC: 10 MG/DL (ref 6–23)
CALCIUM SERPL-MCNC: 9.1 MG/DL (ref 8.3–10.4)
CHLORIDE SERPL-SCNC: 109 MMOL/L (ref 101–110)
CO2 SERPL-SCNC: 30 MMOL/L (ref 21–32)
CREAT SERPL-MCNC: 0.89 MG/DL (ref 0.8–1.5)
DIFFERENTIAL METHOD BLD: ABNORMAL
EOSINOPHIL # BLD: 0.1 K/UL (ref 0–0.8)
EOSINOPHIL NFR BLD: 1 % (ref 0.5–7.8)
ERYTHROCYTE [DISTWIDTH] IN BLOOD BY AUTOMATED COUNT: 12 % (ref 11.9–14.6)
GLOBULIN SER CALC-MCNC: 3.3 G/DL (ref 2.8–4.5)
GLUCOSE SERPL-MCNC: 118 MG/DL (ref 65–100)
HCT VFR BLD AUTO: 47.5 % (ref 41.1–50.3)
HGB BLD-MCNC: 15.7 G/DL (ref 13.6–17.2)
IMM GRANULOCYTES # BLD AUTO: 0.1 K/UL (ref 0–0.5)
IMM GRANULOCYTES NFR BLD AUTO: 0 % (ref 0–5)
LYMPHOCYTES # BLD: 1.1 K/UL (ref 0.5–4.6)
LYMPHOCYTES NFR BLD: 10 % (ref 13–44)
MCH RBC QN AUTO: 29.8 PG (ref 26.1–32.9)
MCHC RBC AUTO-ENTMCNC: 33.1 G/DL (ref 31.4–35)
MCV RBC AUTO: 90.3 FL (ref 82–102)
MONOCYTES # BLD: 0.6 K/UL (ref 0.1–1.3)
MONOCYTES NFR BLD: 6 % (ref 4–12)
NEUTS SEG # BLD: 9.6 K/UL (ref 1.7–8.2)
NEUTS SEG NFR BLD: 83 % (ref 43–78)
NRBC # BLD: 0 K/UL (ref 0–0.2)
PLATELET # BLD AUTO: 230 K/UL (ref 150–450)
PMV BLD AUTO: 9.3 FL (ref 9.4–12.3)
POTASSIUM SERPL-SCNC: 4 MMOL/L (ref 3.5–5.1)
PROT SERPL-MCNC: 6.9 G/DL (ref 6.3–8.2)
RBC # BLD AUTO: 5.26 M/UL (ref 4.23–5.6)
SODIUM SERPL-SCNC: 144 MMOL/L (ref 133–143)
WBC # BLD AUTO: 11.6 K/UL (ref 4.3–11.1)

## 2023-07-11 PROCEDURE — 85025 COMPLETE CBC W/AUTO DIFF WBC: CPT

## 2023-07-11 PROCEDURE — 80053 COMPREHEN METABOLIC PANEL: CPT

## 2023-07-11 PROCEDURE — 96361 HYDRATE IV INFUSION ADD-ON: CPT

## 2023-07-11 PROCEDURE — 99284 EMERGENCY DEPT VISIT MOD MDM: CPT

## 2023-07-11 PROCEDURE — 2580000003 HC RX 258: Performed by: STUDENT IN AN ORGANIZED HEALTH CARE EDUCATION/TRAINING PROGRAM

## 2023-07-11 PROCEDURE — 96374 THER/PROPH/DIAG INJ IV PUSH: CPT

## 2023-07-11 PROCEDURE — 6360000002 HC RX W HCPCS: Performed by: STUDENT IN AN ORGANIZED HEALTH CARE EDUCATION/TRAINING PROGRAM

## 2023-07-11 RX ORDER — DONEPEZIL HYDROCHLORIDE 10 MG/1
10 TABLET, FILM COATED ORAL NIGHTLY
COMMUNITY
Start: 2023-06-29

## 2023-07-11 RX ORDER — METOCLOPRAMIDE HYDROCHLORIDE 5 MG/ML
10 INJECTION INTRAMUSCULAR; INTRAVENOUS ONCE
Status: COMPLETED | OUTPATIENT
Start: 2023-07-11 | End: 2023-07-11

## 2023-07-11 RX ORDER — 0.9 % SODIUM CHLORIDE 0.9 %
1000 INTRAVENOUS SOLUTION INTRAVENOUS
Status: COMPLETED | OUTPATIENT
Start: 2023-07-11 | End: 2023-07-11

## 2023-07-11 RX ORDER — AMANTADINE HYDROCHLORIDE 100 MG/1
CAPSULE, GELATIN COATED ORAL
COMMUNITY
Start: 2023-07-09

## 2023-07-11 RX ORDER — ROPINIROLE 0.5 MG/1
0.5 TABLET, FILM COATED ORAL NIGHTLY
COMMUNITY
Start: 2023-06-29

## 2023-07-11 RX ADMIN — SODIUM CHLORIDE 1000 ML: 9 INJECTION, SOLUTION INTRAVENOUS at 18:21

## 2023-07-11 RX ADMIN — METOCLOPRAMIDE 10 MG: 5 INJECTION, SOLUTION INTRAMUSCULAR; INTRAVENOUS at 18:22

## 2023-07-11 ASSESSMENT — PAIN SCALES - GENERAL
PAINLEVEL_OUTOF10: 1
PAINLEVEL_OUTOF10: 8
PAINLEVEL_OUTOF10: 1

## 2023-07-11 ASSESSMENT — PAIN - FUNCTIONAL ASSESSMENT
PAIN_FUNCTIONAL_ASSESSMENT: 0-10
PAIN_FUNCTIONAL_ASSESSMENT: 0-10

## 2023-07-11 NOTE — DISCHARGE INSTRUCTIONS
Continue to orally hydrate with clear liquids. Alternate Tylenol Motrin as needed for discomfort. Follow-up with your family physician within the week. Return to the ER for worsening or worrisome symptoms.

## 2023-07-11 NOTE — ED TRIAGE NOTES
Patient reports ct myelogram and lumbar puncture yesterday, today has head ache, dizziness, nausea and vomiting.

## 2023-07-11 NOTE — ED PROVIDER NOTES
Emergency Department Provider Note       PCP: Khalif Hernandez DO   Age: 46 y.o. Sex: male     DISPOSITION Decision To Discharge 07/11/2023 07:07:42 PM       ICD-10-CM    1. Post lumbar puncture headache  G97.1           Medical Decision Making     Complexity of Problems Addressed:  Complexity of Problem: 1 acute, uncomplicated illness or injury. Data Reviewed and Analyzed:  Category 1:   I independently ordered and reviewed each unique test.  I reviewed external records: provider visit note from outside specialist.   The patients assessment required an independent historian: wife. The reason they were needed is important historical information not provided by the patient. Category 2:       Category 3: Discussion of management or test interpretation. 80-year-old male presents to the emergency department complaining of headache, worse with standing and sitting up that began today. He reports having a CT myelogram yesterday. He denies fever or chills. Had some nausea as well. No numbness weakness in extremities. History of prior spinal fusions unable to have MRI thus the CT myelogram.  Patient presents with symptoms of post LP headache. Basic lab work was obtained. Normal white count, stable H&H, sodium is elevated, normal electrolytes otherwise and normal kidney function. Will give Reglan and 1 L bolus IV fluid. After these interventions patient reports significant proved of symptoms and states he feels well enough to go home. Able to sit up and stand without return or worsening of symptoms. He will continue to orally hydrate at home. Given outpatient follow-up and strict return precautions. Patient and family voiced understanding and agreement. Risk of Complications and/or Morbidity of Patient Management:  OTC drug management performed and Shared medical decision making was utilized in creating the patients health plan today.     History     Taran Santillan is a 46 y.o. male who presents

## 2023-07-13 ENCOUNTER — HOSPITAL ENCOUNTER (OUTPATIENT)
Dept: INTERVENTIONAL RADIOLOGY/VASCULAR | Age: 53
Discharge: HOME | End: 2023-07-13
Attending: RADIOLOGY
Payer: COMMERCIAL

## 2023-07-13 ENCOUNTER — TRANSCRIBE ORDERS (OUTPATIENT)
Dept: INTERVENTIONAL RADIOLOGY/VASCULAR | Age: 53
End: 2023-07-13

## 2023-07-13 VITALS
TEMPERATURE: 97.7 F | SYSTOLIC BLOOD PRESSURE: 107 MMHG | OXYGEN SATURATION: 92 % | DIASTOLIC BLOOD PRESSURE: 65 MMHG | HEART RATE: 65 BPM | RESPIRATION RATE: 18 BRPM

## 2023-07-13 DIAGNOSIS — G44.89 OTHER HEADACHE SYNDROME: ICD-10-CM

## 2023-07-13 DIAGNOSIS — G44.89 OTHER HEADACHE SYNDROME: Primary | ICD-10-CM

## 2023-07-13 PROCEDURE — 62273 INJECT EPIDURAL PATCH: CPT

## 2023-07-13 PROCEDURE — 6360000004 HC RX CONTRAST MEDICATION: Performed by: RADIOLOGY

## 2023-07-13 PROCEDURE — 2500000003 HC RX 250 WO HCPCS: Performed by: RADIOLOGY

## 2023-07-13 RX ORDER — LIDOCAINE HYDROCHLORIDE 20 MG/ML
INJECTION, SOLUTION INFILTRATION; PERINEURAL PRN
Status: DISCONTINUED | OUTPATIENT
Start: 2023-07-13 | End: 2023-07-17 | Stop reason: HOSPADM

## 2023-07-13 RX ADMIN — LIDOCAINE HYDROCHLORIDE 10 ML: 20 INJECTION, SOLUTION INFILTRATION; PERINEURAL at 15:48

## 2023-07-13 RX ADMIN — IOPAMIDOL 1 ML: 408 INJECTION, SOLUTION INTRATHECAL at 15:50

## 2023-07-13 ASSESSMENT — PAIN DESCRIPTION - LOCATION: LOCATION: HEAD

## 2023-07-13 ASSESSMENT — PAIN SCALES - GENERAL: PAINLEVEL_OUTOF10: 1

## 2023-07-26 ENCOUNTER — OFFICE VISIT (OUTPATIENT)
Dept: NEUROSURGERY | Age: 53
End: 2023-07-26
Payer: COMMERCIAL

## 2023-07-26 VITALS
TEMPERATURE: 99.7 F | HEART RATE: 64 BPM | HEIGHT: 70 IN | WEIGHT: 165.4 LBS | BODY MASS INDEX: 23.68 KG/M2 | SYSTOLIC BLOOD PRESSURE: 112 MMHG | RESPIRATION RATE: 17 BRPM | OXYGEN SATURATION: 96 % | DIASTOLIC BLOOD PRESSURE: 73 MMHG

## 2023-07-26 DIAGNOSIS — Z98.890 HISTORY OF CERVICAL DISCECTOMY: ICD-10-CM

## 2023-07-26 DIAGNOSIS — G20 PARKINSON'S DISEASE (HCC): ICD-10-CM

## 2023-07-26 DIAGNOSIS — Z98.1 HISTORY OF FUSION OF CERVICAL SPINE: ICD-10-CM

## 2023-07-26 DIAGNOSIS — M54.2 NECK PAIN: Primary | ICD-10-CM

## 2023-07-26 PROCEDURE — 99214 OFFICE O/P EST MOD 30 MIN: CPT | Performed by: NEUROLOGICAL SURGERY

## 2023-07-27 DIAGNOSIS — G20 PARKINSON'S DISEASE (HCC): ICD-10-CM

## 2023-07-27 DIAGNOSIS — G20 DEMENTIA DUE TO PARKINSON'S DISEASE WITHOUT BEHAVIORAL DISTURBANCE (HCC): ICD-10-CM

## 2023-07-27 DIAGNOSIS — F02.80 DEMENTIA DUE TO PARKINSON'S DISEASE WITHOUT BEHAVIORAL DISTURBANCE (HCC): ICD-10-CM

## 2023-07-28 RX ORDER — DONEPEZIL HYDROCHLORIDE 10 MG/1
10 TABLET, FILM COATED ORAL NIGHTLY
Qty: 30 TABLET | Refills: 11 | Status: SHIPPED | OUTPATIENT
Start: 2023-07-28

## 2023-07-28 RX ORDER — ROPINIROLE 0.5 MG/1
0.5 TABLET, FILM COATED ORAL NIGHTLY
Qty: 30 TABLET | Refills: 11 | Status: SHIPPED | OUTPATIENT
Start: 2023-07-28 | End: 2024-07-22

## 2023-08-09 ENCOUNTER — HOSPITAL ENCOUNTER (OUTPATIENT)
Dept: GENERAL RADIOLOGY | Age: 53
Discharge: HOME | End: 2023-08-12
Payer: COMMERCIAL

## 2023-08-09 DIAGNOSIS — M54.2 NECK PAIN: ICD-10-CM

## 2023-08-09 DIAGNOSIS — Z98.1 HISTORY OF FUSION OF CERVICAL SPINE: ICD-10-CM

## 2023-08-09 DIAGNOSIS — Z98.890 HISTORY OF CERVICAL DISCECTOMY: ICD-10-CM

## 2023-08-09 DIAGNOSIS — G20 PARKINSON'S DISEASE (HCC): ICD-10-CM

## 2023-08-09 PROCEDURE — 72040 X-RAY EXAM NECK SPINE 2-3 VW: CPT

## 2023-09-13 ENCOUNTER — OFFICE VISIT (OUTPATIENT)
Dept: NEUROLOGY | Age: 53
End: 2023-09-13
Payer: COMMERCIAL

## 2023-09-13 VITALS
SYSTOLIC BLOOD PRESSURE: 105 MMHG | WEIGHT: 163 LBS | DIASTOLIC BLOOD PRESSURE: 67 MMHG | OXYGEN SATURATION: 95 % | BODY MASS INDEX: 23.39 KG/M2 | HEART RATE: 81 BPM

## 2023-09-13 DIAGNOSIS — F02.80 DEMENTIA DUE TO PARKINSON'S DISEASE WITHOUT BEHAVIORAL DISTURBANCE (HCC): ICD-10-CM

## 2023-09-13 DIAGNOSIS — R49.8 HYPOPHONIA: ICD-10-CM

## 2023-09-13 DIAGNOSIS — Z74.09 IMPAIRED FUNCTIONAL MOBILITY, BALANCE, GAIT, AND ENDURANCE: ICD-10-CM

## 2023-09-13 DIAGNOSIS — G20 MOTOR FLUCTUATIONS RELATED TO MEDICATION USE IN PARKINSON'S DISEASE (HCC): ICD-10-CM

## 2023-09-13 DIAGNOSIS — G20 PARKINSON'S DISEASE (HCC): Primary | ICD-10-CM

## 2023-09-13 DIAGNOSIS — F41.9 ANXIETY: ICD-10-CM

## 2023-09-13 DIAGNOSIS — G20 DEMENTIA DUE TO PARKINSON'S DISEASE WITHOUT BEHAVIORAL DISTURBANCE (HCC): ICD-10-CM

## 2023-09-13 DIAGNOSIS — Z96.89 S/P DEEP BRAIN STIMULATOR PLACEMENT: ICD-10-CM

## 2023-09-13 DIAGNOSIS — G25.81 RLS (RESTLESS LEGS SYNDROME): ICD-10-CM

## 2023-09-13 DIAGNOSIS — T42.8X5A MOTOR FLUCTUATIONS RELATED TO MEDICATION USE IN PARKINSON'S DISEASE (HCC): ICD-10-CM

## 2023-09-13 DIAGNOSIS — T42.8X5A LEVODOPA-INDUCED DYSKINESIA: ICD-10-CM

## 2023-09-13 DIAGNOSIS — G24.01 LEVODOPA-INDUCED DYSKINESIA: ICD-10-CM

## 2023-09-13 PROCEDURE — 99215 OFFICE O/P EST HI 40 MIN: CPT | Performed by: PSYCHIATRY & NEUROLOGY

## 2023-09-13 ASSESSMENT — PATIENT HEALTH QUESTIONNAIRE - PHQ9
DEPRESSION UNABLE TO ASSESS: FUNCTIONAL CAPACITY MOTIVATION LIMITS ACCURACY
SUM OF ALL RESPONSES TO PHQ QUESTIONS 1-9: 0
2. FEELING DOWN, DEPRESSED OR HOPELESS: 0
SUM OF ALL RESPONSES TO PHQ QUESTIONS 1-9: 0
SUM OF ALL RESPONSES TO PHQ QUESTIONS 1-9: 0
SUM OF ALL RESPONSES TO PHQ9 QUESTIONS 1 & 2: 0
SUM OF ALL RESPONSES TO PHQ QUESTIONS 1-9: 0
1. LITTLE INTEREST OR PLEASURE IN DOING THINGS: 0

## 2023-09-13 ASSESSMENT — ENCOUNTER SYMPTOMS
COUGH: 0
VOICE CHANGE: 1
CONSTIPATION: 1

## 2023-09-13 NOTE — PROGRESS NOTES
[facility]  [address] [facility] [address], [address]  Phone: [phone] Fax [phone]  Re Castillo MD      Patient: Saadia Chopra  Provider: Re Castillo MD    CC:   Chief Complaint   Patient presents with    Follow-up     Referring Provider:    History of Present Illness:     Saadia Chopra is a 46 y.o. RH male who presents for follow up of Parkinson's disease. He is unaccompanied for today's visit. He was last seen June 2023. He presents for follow-up and continued management of Parkinson's disease, diagnosed in approximately 2009 with tremor in his left upper extremity. He has been followed at [address], undergoing a bilateral STN DBS ([employer]) in May 2017 for his bilateral tremor. He is an INTREPID study participant. Has continued follow up at [address]. He established care with our group in December 2019. Current medications include:  Carbidopa levodopa  mg 1 tablet 4 times a day  Sertraline 50 mg daily  Amantadine 100 mg three times a day  Requip 0.5 mg HS PRN   Donepezil 10 mg at night     Previous medication trials include: N/A    Patient presents today for follow-up. Overall things are reported to be relatively stable. He forgot to bring his DBS  today but reports that he has not needed to make any DBS changes. So DBS interrogation today was ultimately deferred. He has had some ongoing gait and balance disturbances and he did have another fall in the bathroom a couple of weeks ago but fortunately no injuries. He has had a tendency to shuffle his feet and has felt increasingly unsteady. He does have a cane which he uses as needed which seems to help. Moments of hesitation have been noted particularly in doorways or with turns. He does report that the recent DBS changes have been somewhat helpful. No adverse effects with DBS stimulation (in the past he has noted a sensation of facial pulling and speech changes).  He is

## 2023-09-15 RX ORDER — AMANTADINE HYDROCHLORIDE 100 MG/1
100 CAPSULE, GELATIN COATED ORAL 3 TIMES DAILY
Qty: 270 CAPSULE | Refills: 3 | Status: SHIPPED | OUTPATIENT
Start: 2023-09-15

## 2023-10-30 DIAGNOSIS — F32.A DEPRESSION, UNSPECIFIED DEPRESSION TYPE: ICD-10-CM

## 2023-12-07 ENCOUNTER — NURSE ONLY (OUTPATIENT)
Dept: INTERNAL MEDICINE CLINIC | Facility: CLINIC | Age: 53
End: 2023-12-07

## 2023-12-07 DIAGNOSIS — Z00.00 ROUTINE ADULT HEALTH MAINTENANCE: ICD-10-CM

## 2023-12-07 DIAGNOSIS — G20.A1 PARKINSON DISEASE (HCC): ICD-10-CM

## 2023-12-07 DIAGNOSIS — Z12.5 SCREENING FOR PROSTATE CANCER: ICD-10-CM

## 2023-12-07 DIAGNOSIS — F33.0 MAJOR DEPRESSIVE DISORDER, RECURRENT, MILD: ICD-10-CM

## 2023-12-07 LAB
BASOPHILS # BLD: 0.1 K/UL (ref 0–0.2)
BASOPHILS NFR BLD: 2 % (ref 0–2)
CHOLEST SERPL-MCNC: 161 MG/DL
DIFFERENTIAL METHOD BLD: NORMAL
EOSINOPHIL # BLD: 0.1 K/UL (ref 0–0.8)
EOSINOPHIL NFR BLD: 3 % (ref 0.5–7.8)
ERYTHROCYTE [DISTWIDTH] IN BLOOD BY AUTOMATED COUNT: 12.5 % (ref 11.9–14.6)
HCT VFR BLD AUTO: 50 % (ref 41.1–50.3)
HCV AB SER QL: NONREACTIVE
HDLC SERPL-MCNC: 40 MG/DL (ref 40–60)
HDLC SERPL: 4
HGB BLD-MCNC: 16.1 G/DL (ref 13.6–17.2)
HIV 1+2 AB+HIV1 P24 AG SERPL QL IA: NONREACTIVE
HIV 1/2 RESULT COMMENT: NORMAL
IMM GRANULOCYTES # BLD AUTO: 0 K/UL (ref 0–0.5)
IMM GRANULOCYTES NFR BLD AUTO: 0 % (ref 0–5)
LDLC SERPL CALC-MCNC: 111.4 MG/DL
LYMPHOCYTES # BLD: 1.5 K/UL (ref 0.5–4.6)
LYMPHOCYTES NFR BLD: 30 % (ref 13–44)
MCH RBC QN AUTO: 29.8 PG (ref 26.1–32.9)
MCHC RBC AUTO-ENTMCNC: 32.2 G/DL (ref 31.4–35)
MCV RBC AUTO: 92.6 FL (ref 82–102)
MONOCYTES # BLD: 0.5 K/UL (ref 0.1–1.3)
MONOCYTES NFR BLD: 11 % (ref 4–12)
NEUTS SEG # BLD: 2.8 K/UL (ref 1.7–8.2)
NEUTS SEG NFR BLD: 54 % (ref 43–78)
NRBC # BLD: 0 K/UL (ref 0–0.2)
PLATELET # BLD AUTO: 221 K/UL (ref 150–450)
PMV BLD AUTO: 10.1 FL (ref 9.4–12.3)
PSA SERPL-MCNC: 2 NG/ML
RBC # BLD AUTO: 5.4 M/UL (ref 4.23–5.6)
TRIGL SERPL-MCNC: 48 MG/DL (ref 35–150)
TSH, 3RD GENERATION: 0.69 UIU/ML (ref 0.36–3.74)
VLDLC SERPL CALC-MCNC: 9.6 MG/DL (ref 6–23)
WBC # BLD AUTO: 5.1 K/UL (ref 4.3–11.1)

## 2023-12-08 LAB
EST. AVERAGE GLUCOSE BLD GHB EST-MCNC: 103 MG/DL
HBA1C MFR BLD: 5.2 % (ref 4.8–5.6)

## 2023-12-14 ENCOUNTER — OFFICE VISIT (OUTPATIENT)
Dept: INTERNAL MEDICINE CLINIC | Facility: CLINIC | Age: 53
End: 2023-12-14
Payer: COMMERCIAL

## 2023-12-14 VITALS
BODY MASS INDEX: 23.91 KG/M2 | OXYGEN SATURATION: 97 % | TEMPERATURE: 98.7 F | HEIGHT: 70 IN | HEART RATE: 74 BPM | DIASTOLIC BLOOD PRESSURE: 64 MMHG | WEIGHT: 167 LBS | SYSTOLIC BLOOD PRESSURE: 100 MMHG

## 2023-12-14 DIAGNOSIS — F02.80 DEMENTIA DUE TO PARKINSON'S DISEASE WITHOUT BEHAVIORAL DISTURBANCE (HCC): ICD-10-CM

## 2023-12-14 DIAGNOSIS — G20.A1 DEMENTIA DUE TO PARKINSON'S DISEASE WITHOUT BEHAVIORAL DISTURBANCE (HCC): ICD-10-CM

## 2023-12-14 DIAGNOSIS — K62.89 RECTAL PAIN: ICD-10-CM

## 2023-12-14 DIAGNOSIS — K92.1 BLOOD IN STOOL: Primary | ICD-10-CM

## 2023-12-14 DIAGNOSIS — G20.B1 PARKINSON'S DISEASE WITH DYSKINESIA, UNSPECIFIED WHETHER MANIFESTATIONS FLUCTUATE: ICD-10-CM

## 2023-12-14 DIAGNOSIS — Z00.00 ROUTINE ADULT HEALTH MAINTENANCE: ICD-10-CM

## 2023-12-14 PROCEDURE — 99214 OFFICE O/P EST MOD 30 MIN: CPT | Performed by: INTERNAL MEDICINE

## 2023-12-14 RX ORDER — HYDROCORTISONE ACETATE PRAMOXINE HCL 1; 1 G/100G; G/100G
CREAM TOPICAL
Qty: 30 G | Refills: 1 | Status: SHIPPED | OUTPATIENT
Start: 2023-12-14

## 2023-12-14 NOTE — PROGRESS NOTES
Future  -     CBC with Auto Differential; Future  -     Lipid Panel; Future  -     TSH; Future    Rectal pain  Comments:  refer for colo, had neg colog but he wants something done  Orders:  -     [address] [facility]  -     PSA Screening; Future  -     Comprehensive Metabolic Panel; Future  -     CBC with Auto Differential; Future  -     Lipid Panel; Future  -     TSH; Future    Parkinson's disease with dyskinesia, unspecified whether manifestations fluctuate  -     PSA Screening; Future  -     Comprehensive Metabolic Panel; Future  -     CBC with Auto Differential; Future  -     Lipid Panel; Future  -     TSH; Future    Dementia due to Parkinson's disease without behavioral disturbance ([address])    Routine adult health maintenance  -     PSA Screening; Future  -     Comprehensive Metabolic Panel; Future  -     CBC with Auto Differential; Future  -     Lipid Panel; Future  -     TSH;  Future    Other orders  -     hydrocortisone ace-pramoxine (ANALPRAM-HC) 1-1 % cream; Apply coverage to rectum bid mary Orozco Drafts, DO

## 2024-01-18 ENCOUNTER — OFFICE VISIT (OUTPATIENT)
Age: 54
End: 2024-01-18
Payer: COMMERCIAL

## 2024-01-18 VITALS
HEART RATE: 78 BPM | BODY MASS INDEX: 24.51 KG/M2 | WEIGHT: 171.2 LBS | HEIGHT: 70 IN | DIASTOLIC BLOOD PRESSURE: 72 MMHG | SYSTOLIC BLOOD PRESSURE: 110 MMHG | RESPIRATION RATE: 16 BRPM | OXYGEN SATURATION: 97 %

## 2024-01-18 DIAGNOSIS — K92.1 HEMATOCHEZIA: Primary | ICD-10-CM

## 2024-01-18 DIAGNOSIS — K64.9 HEMORRHOIDS, UNSPECIFIED HEMORRHOID TYPE: ICD-10-CM

## 2024-01-18 PROCEDURE — 99203 OFFICE O/P NEW LOW 30 MIN: CPT | Performed by: PHYSICIAN ASSISTANT

## 2024-01-18 RX ORDER — HYDROCORTISONE 25 MG/G
CREAM TOPICAL
Qty: 28 G | Refills: 0 | Status: SHIPPED | OUTPATIENT
Start: 2024-01-18

## 2024-01-18 NOTE — PATIENT INSTRUCTIONS
For hemorrhoids:   Take Miralax 1 cap daily for constipation and titrate up or down as needed until having regular daily bowel movements. Increase daily fiber intake to 20-30 grams daily either by dietary intake or an over-the-counter supplement such as Citrucel. Limit alcohol and fatty food intake. Drink at least 80 oz water daily. Exercise regularly and consider weight loss if appropriate based on your current weight. Avoid straining or lingering on the toilet longer than necessary. Try scheduled toilet time approximately 30 minutes after your first drink or meal of the day. Elevate your feet when toileting to bring your knees in line with your hips using a small stool or Squatty Potty. Review your medication list and discuss with your PCP whether any of these medications may exacerbate constipation. Try warm sitz baths 2-3 times daily for acute hemorrhoidal flare-up. Apply topical or suppository steroid (such as Preparation H or Anusol) for no longer than 7 days. You can also try lidocaine creams (Preparation H + Lidocaine) or Recticare 5% for pain relief. Try witch hazel pads for itching or burning. Keep the anal area clean and dry; avoid excessive or aggressive wiping. Consider wet wipe usage as needed for hygiene and comfort.

## 2024-01-18 NOTE — PROGRESS NOTES
Pulmonary:      Effort: Pulmonary effort is normal. No respiratory distress.      Breath sounds: Normal breath sounds.   Abdominal:      General: There is no distension.      Palpations: Abdomen is soft.      Tenderness: There is no abdominal tenderness. There is no guarding or rebound.   Skin:     General: Skin is warm and dry.      Coloration: Skin is not jaundiced.   Neurological:      General: No focal deficit present.      Mental Status: He is alert and oriented to person, place, and time.   Psychiatric:         Mood and Affect: Mood normal.         Behavior: Behavior normal.         Thought Content: Thought content normal.         Judgment: Judgment normal.              Return for scheduled colonoscopy.            An electronic signature was used to authenticate this note.    --Melissa R Grinnell, PA-C

## 2024-01-19 ENCOUNTER — PREP FOR PROCEDURE (OUTPATIENT)
Dept: GASTROENTEROLOGY | Age: 54
End: 2024-01-19

## 2024-01-19 DIAGNOSIS — Z12.11 ENCOUNTER FOR SCREENING FOR MALIGNANT NEOPLASM OF COLON: Primary | ICD-10-CM

## 2024-01-19 DIAGNOSIS — K92.1 HEMATOCHEZIA: ICD-10-CM

## 2024-01-19 PROBLEM — K64.9 HEMORRHOIDS: Status: ACTIVE | Noted: 2024-01-19

## 2024-01-19 LAB — ERYTHROCYTE [SEDIMENTATION RATE] IN BLOOD: 3 MM/HR (ref 0–15)

## 2024-01-19 RX ORDER — SODIUM, POTASSIUM,MAG SULFATES 17.5-3.13G
1 SOLUTION, RECONSTITUTED, ORAL ORAL ONCE
Qty: 1 EACH | Refills: 0 | Status: SHIPPED | OUTPATIENT
Start: 2024-01-19 | End: 2024-01-19

## 2024-01-19 RX ORDER — SODIUM CHLORIDE 0.9 % (FLUSH) 0.9 %
5-40 SYRINGE (ML) INJECTION PRN
Status: CANCELLED | OUTPATIENT
Start: 2024-01-19

## 2024-01-19 RX ORDER — SODIUM CHLORIDE 9 MG/ML
25 INJECTION, SOLUTION INTRAVENOUS PRN
Status: CANCELLED | OUTPATIENT
Start: 2024-01-19

## 2024-01-19 RX ORDER — SODIUM CHLORIDE 0.9 % (FLUSH) 0.9 %
5-40 SYRINGE (ML) INJECTION EVERY 12 HOURS SCHEDULED
Status: CANCELLED | OUTPATIENT
Start: 2024-01-19

## 2024-01-21 LAB — CALPROTECTIN STL-MCNT: 9 UG/G (ref 0–120)

## 2024-01-23 NOTE — PROGRESS NOTES
Patient verified name, , and procedure.    Type: 1a; abbreviated assessment per anesthesia guidelines    Labs per anesthesia: none    Instructed pt that they will be notified the day before their procedure by the GI Lab for time of arrival if their procedure is [place] and Pre-op for [place] cases. Arrival times should be called by 5 pm. If no phone is received the patient should contact their respective hospital. The GI lab telephone number is [phone] and ES Pre-op is [phone].     Follow diet and prep instructions per office including NPO status.  If patient has NOT received instructions from office patient is advised to call surgeon office, verbalizes understanding.    Bath or shower the night before and the am of surgery with non-moisturizing soap. No lotions, oils, powders, cologne on skin. No make up, eye make up or jewelry. Wear loose fitting comfortable, clean clothing.     Must have adult present in building the entire time .     Medications for the day of procedure AMANTADINE, SINEMET, ZOLOFT, patient to hold ARICEPT per anesthesia guidelines.

## 2024-01-24 ENCOUNTER — ANESTHESIA EVENT (OUTPATIENT)
Dept: ENDOSCOPY | Age: 54
End: 2024-01-24
Payer: COMMERCIAL

## 2024-01-24 LAB — LACTOFERRIN, FECAL: <1 UG/ML(G) (ref 0–7.24)

## 2024-01-25 ENCOUNTER — ANESTHESIA (OUTPATIENT)
Dept: ENDOSCOPY | Age: 54
End: 2024-01-25
Payer: COMMERCIAL

## 2024-01-25 ENCOUNTER — HOSPITAL ENCOUNTER (OUTPATIENT)
Age: 54
Setting detail: OUTPATIENT SURGERY
Discharge: HOME | End: 2024-01-25
Attending: INTERNAL MEDICINE | Admitting: INTERNAL MEDICINE
Payer: COMMERCIAL

## 2024-01-25 VITALS
DIASTOLIC BLOOD PRESSURE: 75 MMHG | OXYGEN SATURATION: 98 % | TEMPERATURE: 98 F | SYSTOLIC BLOOD PRESSURE: 113 MMHG | RESPIRATION RATE: 15 BRPM | BODY MASS INDEX: 23.96 KG/M2 | WEIGHT: 167.4 LBS | HEIGHT: 70 IN | HEART RATE: 64 BPM

## 2024-01-25 PROCEDURE — 3700000001 HC ADD 15 MINUTES (ANESTHESIA): Performed by: INTERNAL MEDICINE

## 2024-01-25 PROCEDURE — 2500000003 HC RX 250 WO HCPCS: Performed by: NURSE ANESTHETIST, CERTIFIED REGISTERED

## 2024-01-25 PROCEDURE — C1889 IMPLANT/INSERT DEVICE, NOC: HCPCS | Performed by: INTERNAL MEDICINE

## 2024-01-25 PROCEDURE — 3609009300 HC COLONOSCOPY BIOPSY/STOMA: Performed by: INTERNAL MEDICINE

## 2024-01-25 PROCEDURE — 7100000010 HC PHASE II RECOVERY - FIRST 15 MIN: Performed by: INTERNAL MEDICINE

## 2024-01-25 PROCEDURE — 3700000000 HC ANESTHESIA ATTENDED CARE: Performed by: INTERNAL MEDICINE

## 2024-01-25 PROCEDURE — 7100000011 HC PHASE II RECOVERY - ADDTL 15 MIN: Performed by: INTERNAL MEDICINE

## 2024-01-25 PROCEDURE — 88305 TISSUE EXAM BY PATHOLOGIST: CPT

## 2024-01-25 PROCEDURE — 2709999900 HC NON-CHARGEABLE SUPPLY: Performed by: INTERNAL MEDICINE

## 2024-01-25 PROCEDURE — 2580000003 HC RX 258: Performed by: NURSE ANESTHETIST, CERTIFIED REGISTERED

## 2024-01-25 PROCEDURE — 6360000002 HC RX W HCPCS: Performed by: NURSE ANESTHETIST, CERTIFIED REGISTERED

## 2024-01-25 DEVICE — CLIP
Type: IMPLANTABLE DEVICE | Site: SIGMOID COLON | Status: FUNCTIONAL
Brand: RESOLUTION 360™ ULTRA CLIP

## 2024-01-25 RX ORDER — SODIUM CHLORIDE 0.9 % (FLUSH) 0.9 %
5-40 SYRINGE (ML) INJECTION EVERY 12 HOURS SCHEDULED
Status: DISCONTINUED | OUTPATIENT
Start: 2024-01-25 | End: 2024-01-25 | Stop reason: HOSPADM

## 2024-01-25 RX ORDER — SODIUM CHLORIDE 0.9 % (FLUSH) 0.9 %
5-40 SYRINGE (ML) INJECTION PRN
Status: DISCONTINUED | OUTPATIENT
Start: 2024-01-25 | End: 2024-01-25 | Stop reason: HOSPADM

## 2024-01-25 RX ORDER — FENTANYL CITRATE 50 UG/ML
25 INJECTION, SOLUTION INTRAMUSCULAR; INTRAVENOUS
Status: DISCONTINUED | OUTPATIENT
Start: 2024-01-25 | End: 2024-01-25 | Stop reason: HOSPADM

## 2024-01-25 RX ORDER — LIDOCAINE HYDROCHLORIDE 10 MG/ML
1 INJECTION, SOLUTION INFILTRATION; PERINEURAL
Status: DISCONTINUED | OUTPATIENT
Start: 2024-01-25 | End: 2024-01-25 | Stop reason: HOSPADM

## 2024-01-25 RX ORDER — DIPHENHYDRAMINE HYDROCHLORIDE 50 MG/ML
12.5 INJECTION INTRAMUSCULAR; INTRAVENOUS
Status: DISCONTINUED | OUTPATIENT
Start: 2024-01-25 | End: 2024-01-25 | Stop reason: HOSPADM

## 2024-01-25 RX ORDER — ONDANSETRON 2 MG/ML
4 INJECTION INTRAMUSCULAR; INTRAVENOUS
Status: DISCONTINUED | OUTPATIENT
Start: 2024-01-25 | End: 2024-01-25 | Stop reason: HOSPADM

## 2024-01-25 RX ORDER — OXYCODONE HYDROCHLORIDE 5 MG/1
5 TABLET ORAL
Status: DISCONTINUED | OUTPATIENT
Start: 2024-01-25 | End: 2024-01-25 | Stop reason: HOSPADM

## 2024-01-25 RX ORDER — SODIUM CHLORIDE, SODIUM LACTATE, POTASSIUM CHLORIDE, CALCIUM CHLORIDE 600; 310; 30; 20 MG/100ML; MG/100ML; MG/100ML; MG/100ML
INJECTION, SOLUTION INTRAVENOUS CONTINUOUS PRN
Status: DISCONTINUED | OUTPATIENT
Start: 2024-01-25 | End: 2024-01-25 | Stop reason: SDUPTHER

## 2024-01-25 RX ORDER — LIDOCAINE HYDROCHLORIDE 20 MG/ML
INJECTION, SOLUTION EPIDURAL; INFILTRATION; INTRACAUDAL; PERINEURAL PRN
Status: DISCONTINUED | OUTPATIENT
Start: 2024-01-25 | End: 2024-01-25 | Stop reason: SDUPTHER

## 2024-01-25 RX ORDER — FENTANYL CITRATE 50 UG/ML
25 INJECTION, SOLUTION INTRAMUSCULAR; INTRAVENOUS EVERY 5 MIN PRN
Status: DISCONTINUED | OUTPATIENT
Start: 2024-01-25 | End: 2024-01-25 | Stop reason: HOSPADM

## 2024-01-25 RX ORDER — SODIUM CHLORIDE 9 MG/ML
25 INJECTION, SOLUTION INTRAVENOUS PRN
Status: DISCONTINUED | OUTPATIENT
Start: 2024-01-25 | End: 2024-01-25 | Stop reason: HOSPADM

## 2024-01-25 RX ORDER — PROPOFOL 10 MG/ML
INJECTION, EMULSION INTRAVENOUS PRN
Status: DISCONTINUED | OUTPATIENT
Start: 2024-01-25 | End: 2024-01-25 | Stop reason: SDUPTHER

## 2024-01-25 RX ORDER — SODIUM CHLORIDE, SODIUM LACTATE, POTASSIUM CHLORIDE, CALCIUM CHLORIDE 600; 310; 30; 20 MG/100ML; MG/100ML; MG/100ML; MG/100ML
INJECTION, SOLUTION INTRAVENOUS CONTINUOUS
Status: DISCONTINUED | OUTPATIENT
Start: 2024-01-25 | End: 2024-01-25 | Stop reason: HOSPADM

## 2024-01-25 RX ORDER — METOCLOPRAMIDE HYDROCHLORIDE 5 MG/ML
10 INJECTION INTRAMUSCULAR; INTRAVENOUS
Status: DISCONTINUED | OUTPATIENT
Start: 2024-01-25 | End: 2024-01-25 | Stop reason: HOSPADM

## 2024-01-25 RX ORDER — FENTANYL CITRATE 50 UG/ML
100 INJECTION, SOLUTION INTRAMUSCULAR; INTRAVENOUS
Status: DISCONTINUED | OUTPATIENT
Start: 2024-01-25 | End: 2024-01-25 | Stop reason: HOSPADM

## 2024-01-25 RX ORDER — GLYCOPYRROLATE 0.2 MG/ML
INJECTION INTRAMUSCULAR; INTRAVENOUS PRN
Status: DISCONTINUED | OUTPATIENT
Start: 2024-01-25 | End: 2024-01-25 | Stop reason: SDUPTHER

## 2024-01-25 RX ORDER — SODIUM CHLORIDE 9 MG/ML
INJECTION, SOLUTION INTRAVENOUS PRN
Status: DISCONTINUED | OUTPATIENT
Start: 2024-01-25 | End: 2024-01-25 | Stop reason: HOSPADM

## 2024-01-25 RX ORDER — HYDROMORPHONE HYDROCHLORIDE 1 MG/ML
0.5 INJECTION, SOLUTION INTRAMUSCULAR; INTRAVENOUS; SUBCUTANEOUS EVERY 10 MIN PRN
Status: DISCONTINUED | OUTPATIENT
Start: 2024-01-25 | End: 2024-01-25 | Stop reason: HOSPADM

## 2024-01-25 RX ADMIN — GLYCOPYRROLATE 0.1 MG: 0.2 INJECTION INTRAMUSCULAR; INTRAVENOUS at 07:58

## 2024-01-25 RX ADMIN — SODIUM CHLORIDE, SODIUM LACTATE, POTASSIUM CHLORIDE, AND CALCIUM CHLORIDE: 600; 310; 30; 20 INJECTION, SOLUTION INTRAVENOUS at 07:54

## 2024-01-25 RX ADMIN — LIDOCAINE HYDROCHLORIDE 50 MG: 20 INJECTION, SOLUTION EPIDURAL; INFILTRATION; INTRACAUDAL; PERINEURAL at 07:58

## 2024-01-25 RX ADMIN — PROPOFOL 40 MG: 10 INJECTION, EMULSION INTRAVENOUS at 08:00

## 2024-01-25 RX ADMIN — PROPOFOL 200 MCG/KG/MIN: 10 INJECTION, EMULSION INTRAVENOUS at 07:59

## 2024-01-25 RX ADMIN — LIDOCAINE HYDROCHLORIDE 50 MG: 20 INJECTION, SOLUTION EPIDURAL; INFILTRATION; INTRACAUDAL; PERINEURAL at 07:59

## 2024-01-25 RX ADMIN — PROPOFOL 90 MG: 10 INJECTION, EMULSION INTRAVENOUS at 07:58

## 2024-01-25 ASSESSMENT — PAIN - FUNCTIONAL ASSESSMENT: PAIN_FUNCTIONAL_ASSESSMENT: 0-10

## 2024-01-25 ASSESSMENT — PAIN SCALES - GENERAL
PAINLEVEL_OUTOF10: 0

## 2024-01-25 NOTE — ANESTHESIA PRE PROCEDURE
chloride flush 0.9 % injection 5-40 mL  5-40 mL IntraVENous 2 times per day Prince Fitzgerald MD       • sodium chloride flush 0.9 % injection 5-40 mL  5-40 mL IntraVENous PRN Prince Fitzgerald MD       • 0.9 % sodium chloride infusion  25 mL IntraVENous PRN Prince Fitzgerald MD           Allergies:  No Known Allergies    Problem List:    Patient Active Problem List   Diagnosis Code   • Dementia due to Parkinson's disease without behavioral disturbance (HCC) G20.A1, F02.80   • Major depressive disorder, recurrent, mild (HCC) F33.0   • Parkinson's disease G20.A1   • Hematochezia K92.1   • Hemorrhoids K64.9       Past Medical History:        Diagnosis Date   • Depression    • Parkinson disease 2009   • Parkinson's syndrome        Past Surgical History:        Procedure Laterality Date   • HERNIA REPAIR  05/24/2021    Robotic RIHR/UHR   • HERNIA REPAIR     • NECK SURGERY     • OTHER SURGICAL HISTORY  2017    DBS-implant   • OTHER SURGICAL HISTORY  1988, 2016    neck, fused vertrbrae       Social History:    Social History     Tobacco Use   • Smoking status: Never   • Smokeless tobacco: Never   Substance Use Topics   • Alcohol use: Yes     Comment: 3 glasses beer /week                                Counseling given: Not Answered      Vital Signs (Current):   Vitals:    01/23/24 1341 01/25/24 0635   BP:  116/77   Pulse:  78   Resp:  18   Temp:  97.2 °F (36.2 °C)   TempSrc:  Temporal   SpO2:  99%   Weight: 77.6 kg (171 lb) 75.9 kg (167 lb 6.4 oz)   Height: 1.778 m (5' 10\") 1.778 m (5' 10\")                                              BP Readings from Last 3 Encounters:   01/25/24 116/77   01/18/24 110/72   12/14/23 100/64       NPO Status: Time of last liquid consumption: 0115 (mandel prep)                        Time of last solid consumption: 1830                        Date of last liquid consumption: 01/25/24                        Date of last solid food consumption: 01/23/24    BMI:   Wt Readings from Last 3

## 2024-01-25 NOTE — PROCEDURES
Operative Report    Patient: Deven Coyne MRN: [id number]      YOB: 1970  Age: 53 y.o.  Sex: male            Indications:  Rectal bleeding     Preoperative Evaluation: The patient was evaluated prior to the procedure in the GI lab admission area, the patient ASA was recorded .  Consent was obtained from the patient with the risk of perforation bleeding and aspiration.    Anesthesia: ARLEEN-per anesthesia    Complications: None; patient tolerated the procedure well.    EBL -insignificant      Procedure: The patient was sedated in the left lateral decubitus position.  Scope was advanced from the rectum to the cecum.  Evaluation was performed to the cecum twice.  The scope was withdrawn to the rectum, retroflexed view was performed.  The rectal exam was normal.  Preparation was good.     Findings:    3mm polyp in the sigmoid colon- removed with cold forceps  Small hemorrhoids   5mm lesion near anal verge- biopsies taken, clip placed as there was some oozing, and Purastat hemostatic agent was used    Postoperative Diagnosis:   3mm polyp in the sigmoid colon- removed with cold forceps  Small hemorrhoids   5mm lesion near anal verge- biopsies taken, clip placed as there was some oozing, and Purastat hemostatic agent was used  (NEED TO RULE OUT SQUAMOUS CELL CARCINOMA AND HPV)       Recommendations:   Repeat colonoscopy in 5 years if signs for pre-cancerous polyp  Avoid straining for the next week   Monitor for any significant bleeding (some may occur after biopsies)  Will f/u biopsies in 1-2 weeks   Avoid NSAIDs       Signed By:  Prince Fitzgerald MD     January 25, 2024

## 2024-01-25 NOTE — DISCHARGE INSTRUCTIONS
Repeat colonoscopy in 5 years if signs for pre-cancerous polyp  Avoid straining for the next week - stool softener   Monitor for any significant bleeding (some may occur after biopsies)  Will f/u biopsies in 1-2 weeks   Avoid NSAIDs     Instructions following colonoscopy:    ACTIVITY:  Resume usual, basic activities around the house today.  You may be light-headed or sleepy from anesthesia, so be careful going up and down stairs.  Avoid driving, operating machinery, or signing documents for 24 hours.    DIET:  No restriction.  Please note, some people may have nausea or cramps after this procedure which can result in an upset stomach after eating.    Many people have loose stools or diarrhea immediately after colonoscopy. It is also not uncommon to not have a bowel movement for 2-3 days.  No Alcohol for the rest of the day.    PAIN:  Some cramping or gas pain is normal after colonoscopy.  However, if you experience worsening pain over the course of the day, or pain with associated fever please call the office immediately      CALL THE DOCTOR IF:  You have a temperature higher than 101.5° Fahrenheit for more than 6 hours.  You have severe nausea or vomiting not relieved by medication; or diarrhea.      Otherwise, continue home medications as previously prescribed.     MEDICATION INTERACTION:    During your procedure you potentially received a medication or medications which may reduce the effectiveness of oral contraceptives. Please consider other forms of contraception for 1 month following your procedure if you are currently using oral contraceptives as your primary form of birth control. In addition to this, we recommend continuing your oral contraceptive as prescribed, unless otherwise instructed by your physician, during this time.    After general anesthesia or intravenous sedation, for 24 hours or while taking prescription Narcotics:  Limit your activities  A responsible adult needs to be with you for the

## 2024-01-25 NOTE — ANESTHESIA POSTPROCEDURE EVALUATION
Department of Anesthesiology  Postprocedure Note    Patient: Deven Coyne  MRN: [id number]  YOB: 1970  Date of evaluation: 1/25/2024    Procedure Summary       Date: 01/25/24 [place] [facility] [place] [place] [facility] [place]    Anesthesia Start: 0754 Anesthesia Stop: 0832    Procedure: COLONOSCOPY BIOPSY Diagnosis:       Hematochezia      Hemorrhoids, unspecified hemorrhoid type      (Hematochezia [K92.1])      (Hemorrhoids, unspecified hemorrhoid type [K64.9])    Surgeons: Prince Fitzgerald MD Responsible Provider: Srikanth Rodriguez MD    Anesthesia Type: TIVA ASA Status: 2            Anesthesia Type: TIVA    Xochitl Phase I:      Xochitl Phase II: Xochitl Score: 10    Anesthesia Post Evaluation    Patient location during evaluation: PACU  Patient participation: complete - patient participated  Level of consciousness: awake and awake and alert  Airway patency: patent  Nausea & Vomiting: no nausea  Cardiovascular status: hemodynamically stable  Respiratory status: acceptable  Hydration status: euvolemic  Multimodal analgesia pain management approach  Pain management: adequate    No notable events documented.

## 2024-01-25 NOTE — H&P
HISTORY AND PHYSICAL             Date: 1/25/2024        Patient Name: Deven Coyne     YOB: 1970      Age:  53 y.o.      H&P    Rectal bleeding    Past Medical History     Past Medical History:   Diagnosis Date    Depression     Parkinson disease 2009    Parkinson's syndrome         Past Surgical History     Past Surgical History:   Procedure Laterality Date    HERNIA REPAIR  05/24/2021    Robotic [facility]/R    HERNIA REPAIR      NECK SURGERY      OTHER SURGICAL HISTORY  2017    DBS-implant    OTHER SURGICAL HISTORY  1988, 2016    neck, fused vertrbrae        Medications Prior to Admission     Prior to Admission medications    Medication Sig Start Date End Date Taking? Authorizing Provider   hydrocortisone (ANUSOL-HC) 2.5 % CREA rectal cream Apply twice daily to hemorrhoids x 1 week 1/18/24   Grinnell, Melissa R, PA-C   carbidopa-levodopa (SINEMET)  MG per tablet Take 1 tablet by mouth 4 times daily 12/18/23   Ariel Vo MD   amantadine (SYMMETREL) 100 MG capsule Take 1 capsule by mouth 3 times daily 12/18/23   Ariel Vo MD   hydrocortisone ace-pramoxine (ANALPRAM-HC) 1-1 % cream Apply coverage to rectum bid prn 12/14/23   Tc Rolle,    sertraline (ZOLOFT) 50 MG tablet Take 1 tablet by mouth daily 10/31/23   Tc Rolle DO   rOPINIRole (REQUIP) 0.5 MG tablet Take 1 tablet by mouth nightly 7/28/23 7/22/24  Ariel Vo MD   donepezil (ARICEPT) 10 MG tablet Take 1 tablet by mouth nightly 7/28/23   Ariel Vo MD        Allergies     Patient has no known allergies.    Social History     For pertinent, see above.     Family History     Family History   Problem Relation Age of Onset    Obesity Brother     Depression Father     Depression Mother     Other Father         precanerous polyps removed       Review of Systems   - CONSTITUTIONAL: Denies weight loss, fever and chills.    - HEENT: Denies changes in vision and hearing.    - RESPIRATORY: Denies SOB and

## 2024-01-25 NOTE — PERIOP NOTE
Pt's dad janusz lauren may be reached at [phone]. Pt's belongings x 1 bag labeled and placed in the locker room.

## 2024-01-30 ENCOUNTER — TELEPHONE (OUTPATIENT)
Dept: GASTROENTEROLOGY | Age: 54
End: 2024-01-30

## 2024-01-30 NOTE — TELEPHONE ENCOUNTER
Called pt to discuss results and recommendations. Pt states he's still been having bleeding since procedure. He is going to give it another week to determine if bleeding will stop given clips and purastat applied. Pt verbalized understanding and will call back for follow up.    Procedure: The patient was sedated in the left lateral decubitus position.  Scope was advanced from the rectum to the cecum.  Evaluation was performed to the cecum twice.  The scope was withdrawn to the rectum, retroflexed view was performed.  The rectal exam was normal.  Preparation was good.      Findings:    3mm polyp in the sigmoid colon- removed with cold forceps  Small hemorrhoids   5mm lesion near anal verge- biopsies taken, clip placed as there was some oozing, and Purastat hemostatic agent was used     Postoperative Diagnosis:   3mm polyp in the sigmoid colon- removed with cold forceps  Small hemorrhoids   5mm lesion near anal verge- biopsies taken, clip placed as there was some oozing, and Purastat hemostatic agent was used  (NEED TO RULE OUT SQUAMOUS CELL CARCINOMA AND HPV)         Recommendations:   Repeat colonoscopy in 5 years if signs for pre-cancerous polyp  Avoid straining for the next week   Monitor for any significant bleeding (some may occur after biopsies)  Will f/u biopsies in 1-2 weeks   Avoid NSAIDs         DIAGNOSIS       A:  \"SIGMOID POLYP\":  FRAGMENT OF TUBULAR ADENOMA.        B:  \"ANAL VERGE BIOPSIES\":  FRAGMENTS OF LARGE INTESTINE HAVING   INCREASED SECRETORY CELLS SUSPICIOUS FOR EARLY HYPERPLASTIC POLYP IN   ASSOCIATION WITH MINIMALLY HYPERPLASTIC SQUAMOUS EPITHELIUM FOCALLY HAVING   CHANGES OF REPAIR.

## 2024-02-27 DIAGNOSIS — F32.A DEPRESSION, UNSPECIFIED DEPRESSION TYPE: ICD-10-CM

## 2024-03-06 ENCOUNTER — OFFICE VISIT (OUTPATIENT)
Dept: NEUROLOGY | Age: 54
End: 2024-03-06
Payer: COMMERCIAL

## 2024-03-06 VITALS
HEART RATE: 66 BPM | HEIGHT: 70 IN | BODY MASS INDEX: 24.48 KG/M2 | WEIGHT: 171 LBS | DIASTOLIC BLOOD PRESSURE: 74 MMHG | SYSTOLIC BLOOD PRESSURE: 120 MMHG

## 2024-03-06 DIAGNOSIS — G47.52 RBD (REM BEHAVIORAL DISORDER): ICD-10-CM

## 2024-03-06 DIAGNOSIS — Z96.89 S/P DEEP BRAIN STIMULATOR PLACEMENT: ICD-10-CM

## 2024-03-06 DIAGNOSIS — F02.80 DEMENTIA DUE TO PARKINSON'S DISEASE WITHOUT BEHAVIORAL DISTURBANCE (HCC): ICD-10-CM

## 2024-03-06 DIAGNOSIS — G25.81 RLS (RESTLESS LEGS SYNDROME): ICD-10-CM

## 2024-03-06 DIAGNOSIS — F41.9 ANXIETY: ICD-10-CM

## 2024-03-06 DIAGNOSIS — G20.A1 DEMENTIA DUE TO PARKINSON'S DISEASE WITHOUT BEHAVIORAL DISTURBANCE (HCC): ICD-10-CM

## 2024-03-06 DIAGNOSIS — R49.8 HYPOPHONIA: ICD-10-CM

## 2024-03-06 DIAGNOSIS — G20.A1 PARKINSON'S DISEASE WITHOUT DYSKINESIA OR FLUCTUATING MANIFESTATIONS: Primary | ICD-10-CM

## 2024-03-06 DIAGNOSIS — Z74.09 IMPAIRED FUNCTIONAL MOBILITY, BALANCE, GAIT, AND ENDURANCE: ICD-10-CM

## 2024-03-06 PROCEDURE — 99215 OFFICE O/P EST HI 40 MIN: CPT | Performed by: PSYCHIATRY & NEUROLOGY

## 2024-03-06 ASSESSMENT — ENCOUNTER SYMPTOMS
CONSTIPATION: 1
COUGH: 0
VOICE CHANGE: 1

## 2024-03-06 NOTE — PROGRESS NOTES
[facility]  [address], [address]  [place] [address]  Phone: [phone] Fax [phone]  Ariel Vo MD      Patient: Deven Coyne  Provider: Ariel Vo MD    CC:   Chief Complaint   Patient presents with    Follow-up     Parkinson's disease     Referring Provider:    History of Present Illness:     Deven Coyne is a 53 y.o. RH male who presents for follow up of Parkinson's disease.     He is unaccompanied for today's visit.  He was last seen September 2023.      He presents for follow-up and continued management of Parkinson's disease, diagnosed in approximately 2009 with tremor in his left upper extremity. [place] [facility] STN DBS ([employer]) in May 2017 for his bilateral tremor. He is a previous INTREPID study participant at [facility] but is no longer followed there. He established care with our group in December 2019.      Current medications include:  Carbidopa levodopa  mg 1 tablet 4 times a day  Sertraline 50 mg daily  Amantadine 100 mg three times a day  Requip 0.5 mg HS PRN   Donepezil 10 mg at night     Previous medication trials include: N/A    Patient presents today for follow-up.    No significant clinical changes since his last visit.  However he does note gradual progression with both motor and nonmotor issues discussed.    With respect to motor symptoms, he does notice some dystonic like pulling of the left upper extremity and other dyskinetic movements of the left lower extremity at times.  He has made no changes to his DBS stimulation.  He continues to take Sinemet as noted above and denies any significant wearing off in between doses.  Right side appears to be better controlled.  OFF symptoms have mostly involved bradykinesia and rigidity.  Higher levels of DBS stimulation have resulted in facial pulling and speech changes and he currently denies any such symptoms.    He has an ongoing chronic gait and

## 2024-03-06 NOTE — PATIENT INSTRUCTIONS
Try reducing the stimulation slightly to help with dyskinesias.   Other options include a trial of Gocovri (long acting amantadine)  Consider a stimulant such as Adderall for attention and concentration  Physical therapy for the worsening mobility  Speech therapy for the speech and swallowing  Melatonin at night (4-5 mg) for dream behaviors and sleep. May help you rest better.

## 2024-05-20 ENCOUNTER — CLINICAL DOCUMENTATION (OUTPATIENT)
Dept: NEUROLOGY | Age: 54
End: 2024-05-20

## 2024-05-20 NOTE — PROGRESS NOTES
I have reviewed results of the neuropsychological assessment which was done in part for determination of disability benefits through [place] [employer].    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    Patient was noted to have a relatively high intellectual baseline but now falls in the lower end of average range for intelligence.  It does appear that there has been a decline in cognitive ability with working memory and processing speed most affected.  There were noted to be moderate limitations in social functioning and moderate limitations in independent functioning.  This gentleman would be able to manage funds independently benefits are reported.    Records sent for scanning.

## 2024-08-09 ENCOUNTER — TELEPHONE (OUTPATIENT)
Dept: INTERNAL MEDICINE CLINIC | Facility: CLINIC | Age: 54
End: 2024-08-09

## 2024-08-09 NOTE — TELEPHONE ENCOUNTER
Unable to complete disability benefit form for Parkinson's disease. Has see Neurologist Dr Ariel Vo since 12/1/2019 and [facility] before that. They will need to complete. Dr Rolle doesn't determine disability.

## 2024-08-16 DIAGNOSIS — G20.B2 PARKINSON'S DISEASE WITH DYSKINESIA AND FLUCTUATING MANIFESTATIONS (HCC): ICD-10-CM

## 2024-08-16 DIAGNOSIS — F02.80 DEMENTIA DUE TO PARKINSON'S DISEASE WITHOUT BEHAVIORAL DISTURBANCE (HCC): ICD-10-CM

## 2024-08-16 DIAGNOSIS — G20.A1 DEMENTIA DUE TO PARKINSON'S DISEASE WITHOUT BEHAVIORAL DISTURBANCE (HCC): ICD-10-CM

## 2024-08-16 RX ORDER — DONEPEZIL HYDROCHLORIDE 10 MG/1
10 TABLET, FILM COATED ORAL NIGHTLY
Qty: 90 TABLET | Refills: 3 | Status: SHIPPED | OUTPATIENT
Start: 2024-08-16

## 2024-08-16 RX ORDER — ROPINIROLE 0.5 MG/1
0.5 TABLET, FILM COATED ORAL NIGHTLY
Qty: 90 TABLET | Refills: 3 | Status: SHIPPED | OUTPATIENT
Start: 2024-08-16 | End: 2025-08-11

## 2024-09-06 ENCOUNTER — OFFICE VISIT (OUTPATIENT)
Dept: NEUROLOGY | Age: 54
End: 2024-09-06

## 2024-09-06 VITALS
SYSTOLIC BLOOD PRESSURE: 124 MMHG | HEIGHT: 70 IN | HEART RATE: 82 BPM | BODY MASS INDEX: 24.2 KG/M2 | WEIGHT: 169 LBS | DIASTOLIC BLOOD PRESSURE: 77 MMHG

## 2024-09-06 DIAGNOSIS — G20.B2 PARKINSON'S DISEASE WITH DYSKINESIA AND FLUCTUATING MANIFESTATIONS (HCC): Primary | ICD-10-CM

## 2024-09-06 DIAGNOSIS — G25.81 RLS (RESTLESS LEGS SYNDROME): ICD-10-CM

## 2024-09-06 DIAGNOSIS — F02.80 DEMENTIA DUE TO PARKINSON'S DISEASE WITHOUT BEHAVIORAL DISTURBANCE (HCC): ICD-10-CM

## 2024-09-06 DIAGNOSIS — G20.A1 DEMENTIA DUE TO PARKINSON'S DISEASE WITHOUT BEHAVIORAL DISTURBANCE (HCC): ICD-10-CM

## 2024-09-06 DIAGNOSIS — Z74.09 IMPAIRED FUNCTIONAL MOBILITY, BALANCE, GAIT, AND ENDURANCE: ICD-10-CM

## 2024-09-06 DIAGNOSIS — R49.8 HYPOPHONIA: ICD-10-CM

## 2024-09-06 DIAGNOSIS — F41.9 ANXIETY: ICD-10-CM

## 2024-09-06 DIAGNOSIS — G47.52 RBD (REM BEHAVIORAL DISORDER): ICD-10-CM

## 2024-09-06 DIAGNOSIS — Z96.89 S/P DEEP BRAIN STIMULATOR PLACEMENT: ICD-10-CM

## 2024-09-06 ASSESSMENT — ENCOUNTER SYMPTOMS
VOICE CHANGE: 1
CONSTIPATION: 1
COUGH: 0

## 2024-09-06 NOTE — PROGRESS NOTES
dyskinesias, gait disturbances, and cognitive impairment.    Parkinson's disease s/p DBS:   Continue Sinemet  mg 1 tablet 4 times a day.    Slight DBS changes made today as noted above.  Increases in stimulation for his left side have resulted in some worsening dyskinesias and other adverse effects.  In the future, we also discussed the possibility of trials of Gocovri which may be helpful for worsening dyskinesias (in place of amantadine).    Parkinson's disease dementia:   Continue donepezil 10 mg at night. Continue to monitor for any worsening cognitive symptoms or any psychotic symptoms.  Consider the possibility of trials of stimulants such as Adderall for fatigue and concentration deficits.    Anxiety:   Continue sertraline 50 mg daily for his mood.    Impaired mobility and balance:   Consider referrals to physical therapy for his gait/mobility should symptoms worsen. Encouraged continued use of his cane.    Hypophonia:   Stable. Referral to speech therapy in the future should symptoms worsen.    RLS:   He may continue ropinirole at night as needed for restless leg symptoms.   Monitor for any worsening myoclonus; may consider trials of gabapentin in the future.       We will see him back in approximately 6 months.       Signature: Ariel Vo MD      Date:  9/8/2024    [address], [address] [place] [address]  Ph: [phone]  Fax: [phone]         I have personally interviewed and examined Mr. Deven Coyne and I have personally reviewed all relevant records including labs and imaging as noted above. I have written all aspects of this note. More than 50% of this time was used for counseling regarding my diagnosis, prognosis, and plans for management.     DBS Programming Time: 15 minutes  Total E&M Time: (not including DBS): 35 minutes.

## 2024-09-20 ENCOUNTER — OFFICE VISIT (OUTPATIENT)
Dept: INTERNAL MEDICINE CLINIC | Facility: CLINIC | Age: 54
End: 2024-09-20
Payer: COMMERCIAL

## 2024-09-20 VITALS
WEIGHT: 170 LBS | BODY MASS INDEX: 24.34 KG/M2 | DIASTOLIC BLOOD PRESSURE: 70 MMHG | HEART RATE: 68 BPM | TEMPERATURE: 98.9 F | SYSTOLIC BLOOD PRESSURE: 104 MMHG | OXYGEN SATURATION: 97 % | HEIGHT: 70 IN

## 2024-09-20 DIAGNOSIS — G20.A2 PARKINSON'S DISEASE WITH FLUCTUATING MANIFESTATIONS, UNSPECIFIED WHETHER DYSKINESIA PRESENT (HCC): ICD-10-CM

## 2024-09-20 DIAGNOSIS — M54.50 ACUTE LEFT-SIDED LOW BACK PAIN WITHOUT SCIATICA: Primary | ICD-10-CM

## 2024-09-20 DIAGNOSIS — F32.A DEPRESSION, UNSPECIFIED DEPRESSION TYPE: ICD-10-CM

## 2024-09-20 PROCEDURE — 99213 OFFICE O/P EST LOW 20 MIN: CPT | Performed by: INTERNAL MEDICINE

## 2024-09-20 RX ORDER — TIZANIDINE 2 MG/1
2 TABLET ORAL EVERY 8 HOURS PRN
Qty: 21 TABLET | Refills: 0 | Status: SHIPPED | OUTPATIENT
Start: 2024-09-20

## 2024-09-20 RX ORDER — CELECOXIB 200 MG/1
200 CAPSULE ORAL 2 TIMES DAILY
Qty: 60 CAPSULE | Refills: 2 | Status: SHIPPED | OUTPATIENT
Start: 2024-09-20

## 2024-09-20 SDOH — ECONOMIC STABILITY: INCOME INSECURITY: HOW HARD IS IT FOR YOU TO PAY FOR THE VERY BASICS LIKE FOOD, HOUSING, MEDICAL CARE, AND HEATING?: PATIENT DECLINED

## 2024-09-20 SDOH — ECONOMIC STABILITY: FOOD INSECURITY: WITHIN THE PAST 12 MONTHS, YOU WORRIED THAT YOUR FOOD WOULD RUN OUT BEFORE YOU GOT MONEY TO BUY MORE.: PATIENT DECLINED

## 2024-09-20 SDOH — ECONOMIC STABILITY: FOOD INSECURITY: WITHIN THE PAST 12 MONTHS, THE FOOD YOU BOUGHT JUST DIDN'T LAST AND YOU DIDN'T HAVE MONEY TO GET MORE.: PATIENT DECLINED

## 2024-09-20 ASSESSMENT — PATIENT HEALTH QUESTIONNAIRE - PHQ9
9. THOUGHTS THAT YOU WOULD BE BETTER OFF DEAD, OR OF HURTING YOURSELF: NOT AT ALL
4. FEELING TIRED OR HAVING LITTLE ENERGY: NEARLY EVERY DAY
2. FEELING DOWN, DEPRESSED OR HOPELESS: NOT AT ALL
6. FEELING BAD ABOUT YOURSELF - OR THAT YOU ARE A FAILURE OR HAVE LET YOURSELF OR YOUR FAMILY DOWN: NOT AT ALL
5. POOR APPETITE OR OVEREATING: NOT AT ALL
1. LITTLE INTEREST OR PLEASURE IN DOING THINGS: NOT AT ALL
SUM OF ALL RESPONSES TO PHQ QUESTIONS 1-9: 7
SUM OF ALL RESPONSES TO PHQ9 QUESTIONS 1 & 2: 0
8. MOVING OR SPEAKING SO SLOWLY THAT OTHER PEOPLE COULD HAVE NOTICED. OR THE OPPOSITE, BEING SO FIGETY OR RESTLESS THAT YOU HAVE BEEN MOVING AROUND A LOT MORE THAN USUAL: NOT AT ALL
SUM OF ALL RESPONSES TO PHQ QUESTIONS 1-9: 7
3. TROUBLE FALLING OR STAYING ASLEEP: NEARLY EVERY DAY
SUM OF ALL RESPONSES TO PHQ QUESTIONS 1-9: 7
SUM OF ALL RESPONSES TO PHQ QUESTIONS 1-9: 7
10. IF YOU CHECKED OFF ANY PROBLEMS, HOW DIFFICULT HAVE THESE PROBLEMS MADE IT FOR YOU TO DO YOUR WORK, TAKE CARE OF THINGS AT HOME, OR GET ALONG WITH OTHER PEOPLE: EXTREMELY DIFFICULT
7. TROUBLE CONCENTRATING ON THINGS, SUCH AS READING THE NEWSPAPER OR WATCHING TELEVISION: SEVERAL DAYS

## 2024-09-20 NOTE — PROGRESS NOTES
Deven was seen today for lower back pain.    Diagnoses and all orders for this visit:    Acute left-sided low back pain without sciatica  Comments:  warned risk falling and getting pain relief  Orders:  -     tiZANidine (ZANAFLEX) 2 MG tablet; Take 1 tablet by mouth every 8 hours as needed (pain,spasm back)  -     celecoxib (CELEBREX) 200 MG capsule; Take 1 capsule by mouth 2 times daily    Depression, unspecified depression type  Comments:  says medicines work well.    Parkinson's disease with fluctuating manifestations, unspecified whether dyskinesia present ([facility])          Deven Coyne is a 53 y.o. male    Chief Complaint   Patient presents with    Lower Back Pain     X 6 days     Advil  Little pain all the time  Pain with bending gets severe,       Chronic issues similar used muscle relaxants, rest,   Orders Only on 01/19/2024   Component Date Value Ref Range Status    Calprotectin 01/19/2024 9  0 - 120 ug/g Final    Comment: (NOTE)  Concentration     Interpretation   Follow-Up  < 5 - 50 ug/g     Normal           None  >50 -120 ug/g     Borderline       Re-evaluate in 4-6 weeks     >120 ug/g     Abnormal         Repeat as clinically                                    indicated  Performed At:  [facility] [id number]  Christopher Jules MD Ph:[phone]      Lactoferrin, Fecal 01/19/2024 <1.00  0.00 - 7.24 ug/ml(g) Final    Comment: (NOTE)  **Results verified by repeat testing**                         Baseline (normal)  0.00 - 7.24                         Elevated                 >7.24  An elevated result is indicative of the presence of  fecal lactoferrin, a marker of intestinal inflammation.  A normal result does not exclude the presence of  intestinal inflammation.  The test can be used as an in vitro diagnostic aid to  distinguish patients with active inflammatory bowel  disease (IBD) from those with non-inflammatory  irritable bowel syndrome (IBS).  Performed At:

## 2024-12-05 ENCOUNTER — LAB (OUTPATIENT)
Dept: INTERNAL MEDICINE CLINIC | Facility: CLINIC | Age: 54
End: 2024-12-05

## 2024-12-05 DIAGNOSIS — Z00.00 ROUTINE ADULT HEALTH MAINTENANCE: ICD-10-CM

## 2024-12-05 DIAGNOSIS — G20.B1 PARKINSON'S DISEASE WITH DYSKINESIA, UNSPECIFIED WHETHER MANIFESTATIONS FLUCTUATE (HCC): ICD-10-CM

## 2024-12-05 DIAGNOSIS — K92.1 BLOOD IN STOOL: ICD-10-CM

## 2024-12-05 DIAGNOSIS — K62.89 RECTAL PAIN: ICD-10-CM

## 2024-12-05 LAB
ALBUMIN SERPL-MCNC: 3.7 G/DL (ref 3.5–5)
ALBUMIN/GLOB SERPL: 1.3 (ref 1–1.9)
ALP SERPL-CCNC: 79 U/L (ref 40–129)
ALT SERPL-CCNC: 6 U/L (ref 8–55)
ANION GAP SERPL CALC-SCNC: 8 MMOL/L (ref 7–16)
AST SERPL-CCNC: 17 U/L (ref 15–37)
BASOPHILS # BLD: 0.1 K/UL (ref 0–0.2)
BASOPHILS NFR BLD: 2 % (ref 0–2)
BILIRUB SERPL-MCNC: 0.7 MG/DL (ref 0–1.2)
BUN SERPL-MCNC: 11 MG/DL (ref 6–23)
CALCIUM SERPL-MCNC: 9 MG/DL (ref 8.8–10.2)
CHLORIDE SERPL-SCNC: 104 MMOL/L (ref 98–107)
CHOLEST SERPL-MCNC: 151 MG/DL (ref 0–200)
CO2 SERPL-SCNC: 28 MMOL/L (ref 20–29)
CREAT SERPL-MCNC: 1 MG/DL (ref 0.8–1.3)
DIFFERENTIAL METHOD BLD: NORMAL
EOSINOPHIL # BLD: 0.1 K/UL (ref 0–0.8)
EOSINOPHIL NFR BLD: 3 % (ref 0.5–7.8)
ERYTHROCYTE [DISTWIDTH] IN BLOOD BY AUTOMATED COUNT: 12.5 % (ref 11.9–14.6)
GLOBULIN SER CALC-MCNC: 2.9 G/DL (ref 2.3–3.5)
GLUCOSE SERPL-MCNC: 82 MG/DL (ref 70–99)
HCT VFR BLD AUTO: 47.6 % (ref 41.1–50.3)
HDLC SERPL-MCNC: 38 MG/DL (ref 40–60)
HDLC SERPL: 4 (ref 0–5)
HGB BLD-MCNC: 15.7 G/DL (ref 13.6–17.2)
IMM GRANULOCYTES # BLD AUTO: 0 K/UL (ref 0–0.5)
IMM GRANULOCYTES NFR BLD AUTO: 0 % (ref 0–5)
LDLC SERPL CALC-MCNC: 102 MG/DL (ref 0–100)
LYMPHOCYTES # BLD: 1.8 K/UL (ref 0.5–4.6)
LYMPHOCYTES NFR BLD: 35 % (ref 13–44)
MCH RBC QN AUTO: 30.1 PG (ref 26.1–32.9)
MCHC RBC AUTO-ENTMCNC: 33 G/DL (ref 31.4–35)
MCV RBC AUTO: 91.4 FL (ref 82–102)
MONOCYTES # BLD: 0.6 K/UL (ref 0.1–1.3)
MONOCYTES NFR BLD: 11 % (ref 4–12)
NEUTS SEG # BLD: 2.5 K/UL (ref 1.7–8.2)
NEUTS SEG NFR BLD: 49 % (ref 43–78)
NRBC # BLD: 0 K/UL (ref 0–0.2)
PLATELET # BLD AUTO: 212 K/UL (ref 150–450)
PMV BLD AUTO: 10 FL (ref 9.4–12.3)
POTASSIUM SERPL-SCNC: 4.3 MMOL/L (ref 3.5–5.1)
PROT SERPL-MCNC: 6.7 G/DL (ref 6.3–8.2)
PSA SERPL-MCNC: 1.8 NG/ML (ref 0–4)
RBC # BLD AUTO: 5.21 M/UL (ref 4.23–5.6)
SODIUM SERPL-SCNC: 139 MMOL/L (ref 136–145)
TRIGL SERPL-MCNC: 55 MG/DL (ref 0–150)
TSH, 3RD GENERATION: 0.84 UIU/ML (ref 0.27–4.2)
VLDLC SERPL CALC-MCNC: 11 MG/DL (ref 6–23)
WBC # BLD AUTO: 5.1 K/UL (ref 4.3–11.1)

## 2024-12-16 ENCOUNTER — OFFICE VISIT (OUTPATIENT)
Dept: INTERNAL MEDICINE CLINIC | Facility: CLINIC | Age: 54
End: 2024-12-16
Payer: COMMERCIAL

## 2024-12-16 VITALS
OXYGEN SATURATION: 97 % | SYSTOLIC BLOOD PRESSURE: 100 MMHG | WEIGHT: 170 LBS | HEART RATE: 74 BPM | BODY MASS INDEX: 24.34 KG/M2 | TEMPERATURE: 98.1 F | HEIGHT: 70 IN | DIASTOLIC BLOOD PRESSURE: 60 MMHG

## 2024-12-16 DIAGNOSIS — F33.0 MAJOR DEPRESSIVE DISORDER, RECURRENT, MILD (HCC): ICD-10-CM

## 2024-12-16 DIAGNOSIS — G20.A1 DEMENTIA DUE TO PARKINSON'S DISEASE WITHOUT BEHAVIORAL DISTURBANCE (HCC): ICD-10-CM

## 2024-12-16 DIAGNOSIS — F02.80 DEMENTIA DUE TO PARKINSON'S DISEASE WITHOUT BEHAVIORAL DISTURBANCE (HCC): ICD-10-CM

## 2024-12-16 DIAGNOSIS — Z12.5 SCREENING FOR PROSTATE CANCER: ICD-10-CM

## 2024-12-16 DIAGNOSIS — Z00.00 ROUTINE ADULT HEALTH MAINTENANCE: Primary | ICD-10-CM

## 2024-12-16 PROCEDURE — 99396 PREV VISIT EST AGE 40-64: CPT | Performed by: INTERNAL MEDICINE

## 2024-12-16 ASSESSMENT — ENCOUNTER SYMPTOMS
VOMITING: 0
SINUS PAIN: 0
TROUBLE SWALLOWING: 1
ABDOMINAL PAIN: 0
SHORTNESS OF BREATH: 0
CONSTIPATION: 0
WHEEZING: 0
DIARRHEA: 0
NAUSEA: 0

## 2024-12-16 NOTE — PROGRESS NOTES
Breath sounds: Normal breath sounds.   Abdominal:      General: Abdomen is flat. Bowel sounds are normal.      Palpations: Abdomen is soft.   Musculoskeletal:      Cervical back: Neck supple. No rigidity.   Skin:     Coloration: Skin is not jaundiced.   Neurological:      General: No focal deficit present.      Mental Status: He is alert.   Psychiatric:         Mood and Affect: Mood normal.         Thought Content: Thought content normal.            Deven was seen today for follow-up.    Diagnoses and all orders for this visit:    Routine adult health maintenance  -     Lipid Panel; Future  -     CBC; Future  -     TSH; Future  -     Comprehensive Metabolic Panel; Future  -     PSA Screening; Future    Dementia due to Parkinson's disease without behavioral disturbance (HCC)  -     Lipid Panel; Future  -     CBC; Future  -     TSH; Future  -     Comprehensive Metabolic Panel; Future  -     PSA Screening; Future    Major depressive disorder, recurrent, mild (HCC)  -     sertraline (ZOLOFT) 50 MG tablet; Take 1 tablet by mouth daily  -     Lipid Panel; Future  -     CBC; Future  -     TSH; Future  -     Comprehensive Metabolic Panel; Future  -     PSA Screening; Future    Screening for prostate cancer  -     PSA Screening; Future                 Tc Rolle DO

## 2025-02-07 ENCOUNTER — OFFICE VISIT (OUTPATIENT)
Dept: INTERNAL MEDICINE CLINIC | Facility: CLINIC | Age: 55
End: 2025-02-07

## 2025-02-07 VITALS
OXYGEN SATURATION: 95 % | TEMPERATURE: 98.7 F | DIASTOLIC BLOOD PRESSURE: 68 MMHG | HEART RATE: 96 BPM | SYSTOLIC BLOOD PRESSURE: 112 MMHG

## 2025-02-07 DIAGNOSIS — R52 GENERALIZED BODY ACHES: ICD-10-CM

## 2025-02-07 DIAGNOSIS — R68.83 CHILLS: ICD-10-CM

## 2025-02-07 DIAGNOSIS — J11.1 INFLUENZA: ICD-10-CM

## 2025-02-07 DIAGNOSIS — R50.9 FEVER, UNSPECIFIED FEVER CAUSE: Primary | ICD-10-CM

## 2025-02-07 LAB
EXP DATE SOLUTION: NORMAL
EXP DATE SWAB: NORMAL
EXPIRATION DATE: NORMAL
INFLUENZA A ANTIGEN, POC: POSITIVE
INFLUENZA B ANTIGEN, POC: NEGATIVE
LOT NUMBER POC: NORMAL
LOT NUMBER SOLUTION: NORMAL
LOT NUMBER SWAB: NORMAL
SARS-COV-2 RNA, POC: NEGATIVE
VALID INTERNAL CONTROL, POC: YES

## 2025-02-07 SDOH — ECONOMIC STABILITY: FOOD INSECURITY: WITHIN THE PAST 12 MONTHS, YOU WORRIED THAT YOUR FOOD WOULD RUN OUT BEFORE YOU GOT MONEY TO BUY MORE.: NEVER TRUE

## 2025-02-07 SDOH — ECONOMIC STABILITY: FOOD INSECURITY: WITHIN THE PAST 12 MONTHS, THE FOOD YOU BOUGHT JUST DIDN'T LAST AND YOU DIDN'T HAVE MONEY TO GET MORE.: NEVER TRUE

## 2025-02-07 ASSESSMENT — PATIENT HEALTH QUESTIONNAIRE - PHQ9
SUM OF ALL RESPONSES TO PHQ QUESTIONS 1-9: 16
9. THOUGHTS THAT YOU WOULD BE BETTER OFF DEAD, OR OF HURTING YOURSELF: NOT AT ALL
SUM OF ALL RESPONSES TO PHQ9 QUESTIONS 1 & 2: 2
1. LITTLE INTEREST OR PLEASURE IN DOING THINGS: SEVERAL DAYS
2. FEELING DOWN, DEPRESSED OR HOPELESS: SEVERAL DAYS
10. IF YOU CHECKED OFF ANY PROBLEMS, HOW DIFFICULT HAVE THESE PROBLEMS MADE IT FOR YOU TO DO YOUR WORK, TAKE CARE OF THINGS AT HOME, OR GET ALONG WITH OTHER PEOPLE: VERY DIFFICULT
8. MOVING OR SPEAKING SO SLOWLY THAT OTHER PEOPLE COULD HAVE NOTICED. OR THE OPPOSITE, BEING SO FIGETY OR RESTLESS THAT YOU HAVE BEEN MOVING AROUND A LOT MORE THAN USUAL: NEARLY EVERY DAY
SUM OF ALL RESPONSES TO PHQ QUESTIONS 1-9: 16
5. POOR APPETITE OR OVEREATING: SEVERAL DAYS
6. FEELING BAD ABOUT YOURSELF - OR THAT YOU ARE A FAILURE OR HAVE LET YOURSELF OR YOUR FAMILY DOWN: SEVERAL DAYS
3. TROUBLE FALLING OR STAYING ASLEEP: NEARLY EVERY DAY
SUM OF ALL RESPONSES TO PHQ QUESTIONS 1-9: 16
7. TROUBLE CONCENTRATING ON THINGS, SUCH AS READING THE NEWSPAPER OR WATCHING TELEVISION: NEARLY EVERY DAY
4. FEELING TIRED OR HAVING LITTLE ENERGY: NEARLY EVERY DAY
SUM OF ALL RESPONSES TO PHQ QUESTIONS 1-9: 16

## 2025-02-07 NOTE — PROGRESS NOTES
Deven Coyne (: 1970) is a 54 y.o. male, here for evaluation of the following chief complaint(s):  Cough, Chills, Shortness of Breath, Generalized Body Aches, and Fever     Assessment & Plan  1. Influenza A.  He tested positive for influenza A. It is too late to start Tamiflu as he is already feeling better. He is advised to take Tylenol, rest, and stay hydrated with fluids like chicken soup. He should be noncontagious when he is fever-free without taking any Motrin or Tylenol for 24 to 48 hours. If his condition worsens, a secondary infection may need to be considered.    2. Depression.  He is currently on medication for depression. No suicidal ideation was reported during the visit.  1. Fever, unspecified fever cause  -     AMB POC INFLUENZA A  AND B REAL-TIME RT-PCR  -     AMB POC COVID-19 COV  2. Generalized body aches  -     AMB POC INFLUENZA A  AND B REAL-TIME RT-PCR  -     AMB POC COVID-19 COV  3. Chills  -     AMB POC INFLUENZA A  AND B REAL-TIME RT-PCR  -     AMB POC COVID-19 COV    History of Present Illness  The patient is a 54-year-old male who presents for evaluation of chills, fever, and body aches.    He began experiencing symptoms of chills, fever, and body aches approximately 3 days ago on Tuesday night. His wife had similar symptoms last week. He has received his influenza vaccine this year.    He is currently on medication for depression and reports no suicidal ideation.    IMMUNIZATIONS  He had his influenza vaccine this year.    Social History     Tobacco Use    Smoking status: Former     Current packs/day: 0.00     Average packs/day: 0.3 packs/day for 1.2 years (0.3 ttl pk-yrs)     Types: Cigarettes     Start date: 1989     Quit date: 1990     Years since quittin.1    Smokeless tobacco: Never    Tobacco comments:     Smokeless tobacco   Vaping Use    Vaping status: Never Used   Substance Use Topics    Alcohol use: Yes     Alcohol/week: 3.0 standard drinks of alcohol

## 2025-02-14 DIAGNOSIS — G20.B2 PARKINSON'S DISEASE WITH DYSKINESIA AND FLUCTUATING MANIFESTATIONS (HCC): Primary | ICD-10-CM

## 2025-02-14 RX ORDER — CARBIDOPA AND LEVODOPA 25; 100 MG/1; MG/1
1 TABLET ORAL 4 TIMES DAILY
Qty: 360 TABLET | Refills: 3 | Status: SHIPPED | OUTPATIENT
Start: 2025-02-14

## 2025-02-14 RX ORDER — AMANTADINE HYDROCHLORIDE 100 MG/1
100 CAPSULE, GELATIN COATED ORAL 3 TIMES DAILY
Qty: 270 CAPSULE | Refills: 3 | Status: SHIPPED | OUTPATIENT
Start: 2025-02-14

## 2025-03-11 ASSESSMENT — ENCOUNTER SYMPTOMS
VOICE CHANGE: 1
CONSTIPATION: 1
COUGH: 0

## 2025-03-12 ENCOUNTER — OFFICE VISIT (OUTPATIENT)
Dept: NEUROLOGY | Age: 55
End: 2025-03-12
Payer: COMMERCIAL

## 2025-03-12 VITALS
HEART RATE: 65 BPM | BODY MASS INDEX: 24.39 KG/M2 | DIASTOLIC BLOOD PRESSURE: 70 MMHG | HEIGHT: 70 IN | SYSTOLIC BLOOD PRESSURE: 114 MMHG

## 2025-03-12 DIAGNOSIS — F02.80 DEMENTIA DUE TO PARKINSON'S DISEASE WITHOUT BEHAVIORAL DISTURBANCE (HCC): ICD-10-CM

## 2025-03-12 DIAGNOSIS — F41.9 ANXIETY: ICD-10-CM

## 2025-03-12 DIAGNOSIS — Z74.09 IMPAIRED FUNCTIONAL MOBILITY, BALANCE, GAIT, AND ENDURANCE: ICD-10-CM

## 2025-03-12 DIAGNOSIS — G20.B2 PARKINSON'S DISEASE WITH DYSKINESIA AND FLUCTUATING MANIFESTATIONS (HCC): Primary | ICD-10-CM

## 2025-03-12 DIAGNOSIS — R49.8 HYPOPHONIA: ICD-10-CM

## 2025-03-12 DIAGNOSIS — G20.A1 DEMENTIA DUE TO PARKINSON'S DISEASE WITHOUT BEHAVIORAL DISTURBANCE (HCC): ICD-10-CM

## 2025-03-12 DIAGNOSIS — Z96.89 S/P DEEP BRAIN STIMULATOR PLACEMENT: ICD-10-CM

## 2025-03-12 PROCEDURE — 99214 OFFICE O/P EST MOD 30 MIN: CPT | Performed by: PSYCHIATRY & NEUROLOGY

## 2025-03-12 PROCEDURE — 95983 ALYS BRN NPGT PRGRMG 15 MIN: CPT | Performed by: PSYCHIATRY & NEUROLOGY

## 2025-03-12 NOTE — PROGRESS NOTES
[facility] NEUROLOGY  [address], [address]  [place] [address]  Phone: [phone] Fax [phone]  Ariel Vo MD      Patient: Deven Coyne  Provider: Ariel Vo MD    CC:   Chief Complaint   Patient presents with    Follow-up     Parkinson's disease     Referring Provider:    History of Present Illness:     Deven Coyne is a 54 y.o. RH male who presents for follow up of Parkinson's disease.     He is unaccompanied for today's visit.  He was last seen September 2024.      He presents for follow-up and continued management of Parkinson's disease, diagnosed in approximately 2009 with tremor in his left upper extremity. He has been followed at [facility], undergoing a bilateral STN DBS ([employer]) in May 2017 for his bilateral tremor.      Current medications include:  Carbidopa levodopa  mg 1 tablet 4 times a day (7am,11am,3pm,7pm)  Sertraline 50 mg daily  Amantadine 100 mg three times a day  Requip 0.5 mg HS PRN   Donepezil 10 mg at night     Previous medication trials include: N/A    Patient presents today for follow-up.    No significant clinical changes since his last visit.      He seems to be doing pretty well.  Motor symptoms include breakthrough tremors, L>R, and occasional dystonic movements of the left upper and left lower extremity.  This has been exacerbated in the past with higher levels of DBS stimulation.  Currently symptoms are relatively manageable.  He has not made any other changes to his DBS.    He does note some wearing off in between doses of levodopa, currently taking every 4 hours.  No dyskinesias.  He also remains on amantadine.    He has a gait and balance disturbance but is seen today walking unassisted.  He has a cane at home which he uses as needed.  Brief moments of start hesitation have been noted and doorways in with turns.  He has had a couple of falls.  Has not worked with any physical therapy recently.    Occasional jerking movements

## 2025-08-17 DIAGNOSIS — F02.80 DEMENTIA DUE TO PARKINSON'S DISEASE WITHOUT BEHAVIORAL DISTURBANCE (HCC): ICD-10-CM

## 2025-08-17 DIAGNOSIS — G20.A1 DEMENTIA DUE TO PARKINSON'S DISEASE WITHOUT BEHAVIORAL DISTURBANCE (HCC): ICD-10-CM

## 2025-08-17 DIAGNOSIS — G20.B2 PARKINSON'S DISEASE WITH DYSKINESIA AND FLUCTUATING MANIFESTATIONS (HCC): ICD-10-CM

## 2025-08-18 RX ORDER — DONEPEZIL HYDROCHLORIDE 10 MG/1
10 TABLET, FILM COATED ORAL NIGHTLY
Qty: 90 TABLET | Refills: 3 | Status: SHIPPED | OUTPATIENT
Start: 2025-08-18

## 2025-08-18 RX ORDER — ROPINIROLE 0.5 MG/1
0.5 TABLET, FILM COATED ORAL NIGHTLY
Qty: 90 TABLET | Refills: 3 | Status: SHIPPED | OUTPATIENT
Start: 2025-08-18 | End: 2026-08-13

## (undated) DEVICE — TUBING INSUFFLATION SMK EVAC HI FLO SET PNEUMOCLEAR

## (undated) DEVICE — KENDALL RADIOLUCENT FOAM MONITORING ELECTRODE RECTANGULAR SHAPE: Brand: KENDALL

## (undated) DEVICE — CONTAINER FORMALIN PREFILLED 10% NBF 60ML

## (undated) DEVICE — ELECTRO LUBE IS A SINGLE PATIENT USE DEVICE THAT IS INTENDED TO BE USED ON ELECTROSURGICAL ELECTRODES TO REDUCE STICKING.: Brand: KEY SURGICAL ELECTRO LUBE

## (undated) DEVICE — SINGLE PORT MANIFOLD: Brand: NEPTUNE 2

## (undated) DEVICE — GOWN,REINFORCED,POLY,AURORA,XXLARGE,STR: Brand: MEDLINE

## (undated) DEVICE — KIT,ANTI FOG,W/SPONGE & FLUID,SOFT PACK: Brand: MEDLINE

## (undated) DEVICE — PREP SKN CHLRAPRP APL 26ML STR --

## (undated) DEVICE — SCISSORS SURG DIA8MM MPLR CRV ENDOWRIST

## (undated) DEVICE — PAD,NON-ADHERENT,3X8,STERILE,LF,1/PK: Brand: MEDLINE

## (undated) DEVICE — DRAPE,TOP,102X53,STERILE: Brand: MEDLINE

## (undated) DEVICE — SOLUTION IV 1000ML 0.9% SOD CHL

## (undated) DEVICE — NEEDLE HYPO 21GA L1.5IN GRN POLYPR HUB S STL THN WALL IV

## (undated) DEVICE — SYRINGE MEDICAL 3ML CLEAR PLASTIC STANDARD NON CONTROL LUERLOCK TIP DISPOSABLE

## (undated) DEVICE — GARMENT,MEDLINE,DVT,INT,CALF,MED, GEN2: Brand: MEDLINE

## (undated) DEVICE — INTENDED FOR TISSUE SEPARATION, AND OTHER PROCEDURES THAT REQUIRE A SHARP SURGICAL BLADE TO PUNCTURE OR CUT.: Brand: BARD-PARKER SAFETY BLADES SIZE 15, STERILE

## (undated) DEVICE — TROCAR: Brand: KII FIOS FIRST ENTRY

## (undated) DEVICE — SYR LR LCK 1ML GRAD NSAF 30ML --

## (undated) DEVICE — NEEDLE SYR 18GA L1.5IN RED PLAS HUB S STL BLNT FILL W/O

## (undated) DEVICE — COVER EQUIP ABSRB TBL MOJAVE SHT L228XW101CM

## (undated) DEVICE — COLUMN DRAPE

## (undated) DEVICE — COVER MPLR TIP CRV SCIS ACC DA VINCI

## (undated) DEVICE — AGENT HEMOSTATIC 3 ML SYNTH FOR MILD MOD BLEED STRL PURASTAT

## (undated) DEVICE — SYRINGE IRRIG 60ML SFT PLIABLE BLB EZ TO GRP 1 HND USE W/

## (undated) DEVICE — AIRLIFE™ OXYGEN TUBING 7 FEET (2.1 M) CRUSH RESISTANT OXYGEN TUBING, VINYL TIPPED: Brand: AIRLIFE™

## (undated) DEVICE — VISUALIZATION SYSTEM: Brand: CLEARIFY

## (undated) DEVICE — 2000CC GUARDIAN II: Brand: GUARDIAN

## (undated) DEVICE — YANKAUER,BULB TIP,W/O VENT,RIGID,STERILE: Brand: MEDLINE

## (undated) DEVICE — MEGA SUTURECUT ND: Brand: ENDOWRIST

## (undated) DEVICE — ADHESIVE LIQ 2OZ ADJUNCT FOR DSG MASTISOL

## (undated) DEVICE — APPLICATOR COT-TIP WOOD 6IN -- NON STRL

## (undated) DEVICE — SEAL UNIV 5-8MM DISP BX/10 -- DA VINCI XI - SNGL USE

## (undated) DEVICE — SYRINGE MED 10ML LUERLOCK TIP W/O SFTY DISP

## (undated) DEVICE — INTENDED FOR TISSUE SEPARATION, AND OTHER PROCEDURES THAT REQUIRE A SHARP SURGICAL BLADE TO PUNCTURE OR CUT.: Brand: BARD-PARKER SAFETY BLADES SIZE 11, STERILE

## (undated) DEVICE — CANNULA NSL ORAL AD FOR CAPNOFLEX CO2 O2 AIRLFE

## (undated) DEVICE — FORCEPS BX L240CM JAW DIA2.8MM L CAP W/ NDL MIC MESH TOOTH

## (undated) DEVICE — TIP COVER ACCESSORY

## (undated) DEVICE — TOWEL,OR,DSP,ST,BLUE,DLX,1/PK,80PK/CS: Brand: MEDLINE

## (undated) DEVICE — PROGRASP FORCEPS: Brand: ENDOWRIST

## (undated) DEVICE — SYR 10ML LUER LOK 1/5ML GRAD --

## (undated) DEVICE — STRIP,CLOSURE,WOUND,MEDI-STRIP,1/2X4: Brand: MEDLINE

## (undated) DEVICE — SUTURE ETHBND EXCEL SZ 1 L30IN NONABSORBABLE GRN L36MM CT-1 X425H

## (undated) DEVICE — SUTURE SZ 0 27IN 5/8 CIR UR-6  TAPER PT VIOLET ABSRB VICRYL J603H

## (undated) DEVICE — TROCAR: Brand: KII® SLEEVE

## (undated) DEVICE — 3M™ TEGADERM™ TRANSPARENT FILM DRESSING FRAME STYLE, 1626W, 4 IN X 4-3/4 IN (10 CM X 12 CM), 50/CT 4CT/CASE: Brand: 3M™ TEGADERM™

## (undated) DEVICE — NEEDLE HYPO 21GA L1.5IN INTRAMUSCULAR S STL LATCH BVL UP

## (undated) DEVICE — GOWN,REINF,POLY,ECL,PP SLV,XL: Brand: MEDLINE

## (undated) DEVICE — COVER,LIGHT HANDLE,FLX,2/PK: Brand: MEDLINE INDUSTRIES, INC.

## (undated) DEVICE — BUTTON SWITCH PENCIL BLADE ELECTRODE, HOLSTER: Brand: EDGE

## (undated) DEVICE — [HIGH FLOW INSUFFLATOR,  DO NOT USE IF PACKAGE IS DAMAGED,  KEEP DRY,  KEEP AWAY FROM SUNLIGHT,  PROTECT FROM HEAT AND RADIOACTIVE SOURCES.]: Brand: PNEUMOSURE

## (undated) DEVICE — SUTURE VCRL SZ 3-0 L27IN ABSRB UD L26MM SH 1/2 CIR J416H

## (undated) DEVICE — TOTAL TRAY, DB, 100% SILI FOLEY, 16FR 10: Brand: MEDLINE

## (undated) DEVICE — SHEET, T, LAPAROTOMY, STERILE: Brand: MEDLINE

## (undated) DEVICE — GENERAL LAPAROSCOPY: Brand: MEDLINE INDUSTRIES, INC.

## (undated) DEVICE — APPLICATOR COT-TIP 6IN WOOD -- 2/PK STRL

## (undated) DEVICE — COVER,TABLE,44X76,STERILE: Brand: MEDLINE

## (undated) DEVICE — REM POLYHESIVE ADULT PATIENT RETURN ELECTRODE: Brand: VALLEYLAB

## (undated) DEVICE — BLADELESS OBTURATOR: Brand: WECK VISTA

## (undated) DEVICE — SUTURE VCRL SZ 4-0 L27IN ABSRB UD L19MM PS-2 3/8 CIR PRIM J426H

## (undated) DEVICE — ENDOSCOPIC KIT 1.1+ OP4 CA DE 2 GWN AAMI LEVEL 3

## (undated) DEVICE — GAUZE,SPONGE,4"X4",12PLY,WOVEN,NS,LF: Brand: MEDLINE

## (undated) DEVICE — MASTISOL ADHESIVE LIQ 2/3ML

## (undated) DEVICE — SPONGE: SPECIALTY TONSIL XR MED 100/CS: Brand: MEDICAL ACTION INDUSTRIES

## (undated) DEVICE — SUTURE V-LOC 180 SZ 3-0 L9IN ABSRB GRN L26MM V-20 1/2 CIR VLOCL0644

## (undated) DEVICE — DRAPE,LAP,CHOLE,W/TROUGHS,STERILE: Brand: MEDLINE

## (undated) DEVICE — BLADE SCALP SURG BARD-PARK 10 --

## (undated) DEVICE — ARM DRAPE

## (undated) DEVICE — SYRINGE, LUER SLIP, STERILE, 60ML: Brand: MEDLINE

## (undated) DEVICE — CONNECTOR TBNG OD5-7MM O2 END DISP

## (undated) DEVICE — MEDI-VAC YANKAUER SUCTION HANDLE W/BULBOUS TIP: Brand: CARDINAL HEALTH

## (undated) DEVICE — MINOR SPLIT GENERAL: Brand: MEDLINE INDUSTRIES, INC.